# Patient Record
Sex: FEMALE | Race: WHITE | NOT HISPANIC OR LATINO | Employment: OTHER | URBAN - METROPOLITAN AREA
[De-identification: names, ages, dates, MRNs, and addresses within clinical notes are randomized per-mention and may not be internally consistent; named-entity substitution may affect disease eponyms.]

---

## 2017-01-09 ENCOUNTER — HOSPITAL ENCOUNTER (OUTPATIENT)
Dept: GENERAL RADIOLOGY | Facility: HOSPITAL | Age: 52
Discharge: HOME | End: 2017-01-09
Attending: INTERNAL MEDICINE | Admitting: INTERNAL MEDICINE

## 2017-01-09 DIAGNOSIS — M54.40 LOW BACK PAIN WITH SCIATICA, SCIATICA LATERALITY UNSPECIFIED, UNSPECIFIED BACK PAIN LATERALITY, UNSPECIFIED CHRONICITY: Primary | ICD-10-CM

## 2017-01-09 PROCEDURE — 72100 X-RAY EXAM L-S SPINE 2/3 VWS: CPT

## 2017-01-10 DIAGNOSIS — M54.50 LOW BACK PAIN WITH RADIATION, UNSPECIFIED LATERALITY: Primary | ICD-10-CM

## 2017-01-12 RX ORDER — CELECOXIB 200 MG/1
200 CAPSULE ORAL DAILY
Qty: 90 CAPSULE | Refills: 0 | Status: SHIPPED | OUTPATIENT
Start: 2017-01-12 | End: 2017-04-14 | Stop reason: SDUPTHER

## 2017-01-13 ENCOUNTER — HOSPITAL ENCOUNTER (OUTPATIENT)
Dept: MRI IMAGING | Facility: HOSPITAL | Age: 52
Discharge: HOME | End: 2017-01-13
Attending: INTERNAL MEDICINE | Admitting: INTERNAL MEDICINE

## 2017-01-13 PROCEDURE — 72148 MRI LUMBAR SPINE W/O DYE: CPT

## 2017-01-16 DIAGNOSIS — M51.36 LUMBAR ADJACENT SEGMENT DISEASE WITH SPONDYLOLISTHESIS: ICD-10-CM

## 2017-01-16 DIAGNOSIS — M43.06 LUMBAR SPONDYLOLYSIS: Primary | ICD-10-CM

## 2017-01-16 DIAGNOSIS — M43.16 LUMBAR ADJACENT SEGMENT DISEASE WITH SPONDYLOLISTHESIS: ICD-10-CM

## 2017-01-16 DIAGNOSIS — M54.50 ACUTE MIDLINE LOW BACK PAIN WITHOUT SCIATICA: Primary | ICD-10-CM

## 2017-01-16 RX ORDER — OXYCODONE AND ACETAMINOPHEN 7.5; 325 MG/1; MG/1
1 TABLET ORAL EVERY 8 HOURS PRN
Qty: 30 TABLET | Refills: 0 | Status: SHIPPED | OUTPATIENT
Start: 2017-01-16 | End: 2017-11-13

## 2017-01-20 RX ORDER — ZOLPIDEM TARTRATE 10 MG/1
10 TABLET ORAL NIGHTLY PRN
Qty: 30 TABLET | Refills: 1 | OUTPATIENT
Start: 2017-01-20 | End: 2017-07-03 | Stop reason: SDUPTHER

## 2017-01-21 ENCOUNTER — OFFICE VISIT (OUTPATIENT)
Dept: NEUROSURGERY | Facility: CLINIC | Age: 52
End: 2017-01-21

## 2017-01-21 VITALS — TEMPERATURE: 97.5 F | WEIGHT: 181 LBS | BODY MASS INDEX: 27.43 KG/M2 | HEIGHT: 68 IN

## 2017-01-21 DIAGNOSIS — M47.816 FACET ARTHROPATHY, LUMBAR: ICD-10-CM

## 2017-01-21 DIAGNOSIS — M51.36 DDD (DEGENERATIVE DISC DISEASE), LUMBAR: Primary | ICD-10-CM

## 2017-01-21 DIAGNOSIS — S39.92XA TAILBONE INJURY, INITIAL ENCOUNTER: ICD-10-CM

## 2017-01-21 PROCEDURE — 99243 OFF/OP CNSLTJ NEW/EST LOW 30: CPT | Performed by: NEUROLOGICAL SURGERY

## 2017-01-21 NOTE — MR AVS SNAPSHOT
[facility]  [phone]                    Vanna Black   1/21/2017 9:45 AM   Office Visit    Dept Phone:  [phone]   Encounter #:  [id number]    Provider:  Yuri Bahena MD   Department:  [facility]                Your Full Care Plan              Your Updated Medication List          This list is accurate as of: 1/21/17 10:00 AM.  Always use your most recent med list.                buPROPion  MG 24 hr tablet   Commonly known as:  WELLBUTRIN XL   TAKE ONE TABLET BY MOUTH ONCE DAILY       celecoxib 200 MG capsule   Commonly known as:  CeleBREX   Take 1 capsule by mouth Daily.       HYDROcodone-acetaminophen 7.5-325 MG per tablet   Commonly known as:  NORCO   Take 0.5 tablets by mouth 2 (Two) Times a Day As Needed for moderate pain (4-6).       mupirocin 2 % ointment   Commonly known as:  BACTROBAN       oxyCODONE-acetaminophen 7.5-325 MG per tablet   Commonly known as:  PERCOCET   Take 1 tablet by mouth Every 8 (Eight) Hours As Needed for moderate pain (4-6).       promethazine 25 MG tablet   Commonly known as:  PHENERGAN       * sertraline 50 MG tablet   Commonly known as:  ZOLOFT       * sertraline 100 MG tablet   Commonly known as:  ZOLOFT   TAKE ONE TABLET BY MOUTH ONCE DAILY       triamterene-hydrochlorothiazide 37.5-25 MG per tablet   Commonly known as:  MAXZIDE-25   TAKE ONE TABLET BY MOUTH ONCE DAILY       zolpidem 10 MG tablet   Commonly known as:  AMBIEN   Take 1 tablet by mouth At Night As Needed for sleep.       * Notice:  This list has 2 medication(s) that are the same as other medications prescribed for you. Read the directions carefully, and ask your doctor or other care provider to review them with you.            We Performed the Following     Ambulatory Referral to Physical Therapy Evaluate and treat, Neuro       You Were Diagnosed With        Codes Comments    DDD (degenerative disc disease),  "lumbar    -  Primary ICD-10-CM: M51.36  ICD-9-CM: 722.52     Facet arthropathy, lumbar     ICD-10-CM: M12.88  ICD-9-CM: 721.3     Tailbone injury, initial encounter     ICD-10-CM: S39.92XA  ICD-9-CM: 959.19       Instructions     None    Patient Instructions History      Upcoming Appointments     Visit Type Date Time Department    NEW PATIENT 2017  9:45 AM [facility]      [employer] Signup     [facility] [employer] allows you to send messages to your doctor, view your test results, renew your prescriptions, schedule appointments, and more. To sign up, go to [employer] and click on the Sign Up Now link in the New User? box. Enter your [employer] Activation Code exactly as it appears below along with the last four digits of your Social Security Number and your Date of Birth () to complete the sign-up process. If you do not sign up before the expiration date, you must request a new code.    [employer] Activation Code: [email or web address]  Expires: 2017  9:59 AM    If you have questions, you can email [email or web address] or call [phone] to talk to our [employer] staff. Remember, [employer] is NOT to be used for urgent needs. For medical emergencies, dial 911.               Other Info from Your Visit           Allergies     No Known Allergies      Reason for Visit     Back Pain TAILBONE      Vital Signs     Temperature Height Weight Last Menstrual Period Body Mass Index Smoking Status    97.5 °F (36.4 °C) 68\" (172.7 cm) 181 lb (82.1 kg) (LMP Unknown) 27.52 kg/m2 Never Smoker      Problems and Diagnoses Noted     Degeneration of lumbar or lumbosacral intervertebral disc    -  Primary    Joint disorder        Tailbone injury            "

## 2017-01-21 NOTE — PROGRESS NOTES
"Patient: Vanna Black  : 1965    Primary Care Provider: Abdullahi Nielsen MD    Requesting Provider: As above        History    Chief Complaint: Tailbone and low back pain.    History of Present Illness: Ms. Black is a 51 year old woman who runs an exercise and rehabilitation business who has had chronic low back difficulties over the years.  About 2 weeks ago she fell in a martial arts class and suffered an axial load when she landed on someone's foot.  She's had severe tailbone region pain since that time but she has also had a flareup in her low back pain.  At baseline she has some numbness in her right leg.  She denies any radicular symptoms in her legs.  She denies bowel or bladder dysfunction.  Her pain is a good deal better than it was a couple of weeks ago but she's been inactive.  She has not been doing her normal work.  She has avoided sitting upright.    Review of Systems   Constitutional: Positive for activity change.   Eyes: Positive for visual disturbance.   Gastrointestinal: Positive for abdominal distention.   Musculoskeletal: Positive for back pain, neck pain and neck stiffness.   Psychiatric/Behavioral: Positive for sleep disturbance. The patient is nervous/anxious.    All other systems reviewed and are negative.      The patient's past medical history, past surgical history, family history, and social history have been reviewed at length in the electronic medical record.    Physical Exam:   Visit Vitals   • Temp 97.5 °F (36.4 °C)   • Ht 68\" (172.7 cm)   • Wt 181 lb (82.1 kg)   • LMP  (LMP Unknown)   • BMI 27.52 kg/m2     CONSTITUTIONAL: Patient is well-nourished, pleasant and appears stated age.  CV: Heart regular rate and rhythm without murmur, rub, or gallop.  PULMONARY: Lungs are clear to ascultation.  MUSCULOSKELETAL:  Straight leg raising is negative.  Lloyd's Sign is negative.  ROM in back normal.  Tenderness in the back to palpation is mildly present in the lumbosacral " midline and over the coccyx.  NEUROLOGICAL:  Orientation, memory, attention span, language function, and cognition have been examined and are intact.  Strength is intact in the lower extremities to direct testing.  Muscle tone is normal throughout.  Station and gait are normal.  Sensation is intact to light touch testing throughout except in the side of the right leg where it is diminished.  Deep tendon reflexes are 2+ and symmetrical.  Coordination is intact.      Medical Decision Making    Data Review:   MRI of the lumbar spine demonstrates diffuse degenerative disc disease and diffuse facet arthropathy.  There is no significant root or canal compromise.  I can see down to the low sacrum on the axial images.  The tip of the coccyx is not visualized.    Diagnosis:   Flareup of mechanical low back pain as well as a probable coccygeal contusion.    Treatment Options:   I have referred the patient for physical therapy.  I've also asked that she take Advil 400 mg 3 times a day.  She'll follow-up in my clinic in several weeks to check on her progress.  If the tailbone region pain continues then I'll order some additional studies to actually look at the tip of her coccyx.  Even if she has a fracture the treatment will not be different than it is now.       Diagnosis Plan   1. DDD (degenerative disc disease), lumbar     2. Facet arthropathy, lumbar  Ambulatory Referral to Physical Therapy Evaluate and treat, Neuro   3. Tailbone injury, initial encounter             I, Dr. Bahena, personally performed the services described in the documentation, as scribed in my presence, and it is both accurate and complete.

## 2017-03-27 DIAGNOSIS — M25.511 RIGHT SHOULDER PAIN, UNSPECIFIED CHRONICITY: Primary | ICD-10-CM

## 2017-03-27 DIAGNOSIS — M25.561 RIGHT KNEE PAIN, UNSPECIFIED CHRONICITY: ICD-10-CM

## 2017-03-31 RX ORDER — SERTRALINE HYDROCHLORIDE 100 MG/1
TABLET, FILM COATED ORAL
Qty: 90 TABLET | Refills: 0 | Status: SHIPPED | OUTPATIENT
Start: 2017-03-31 | End: 2017-11-13

## 2017-03-31 RX ORDER — TRIAMTERENE AND HYDROCHLOROTHIAZIDE 37.5; 25 MG/1; MG/1
TABLET ORAL
Qty: 90 TABLET | Refills: 0 | Status: SHIPPED | OUTPATIENT
Start: 2017-03-31 | End: 2017-06-15 | Stop reason: SDUPTHER

## 2017-04-14 RX ORDER — CELECOXIB 200 MG/1
CAPSULE ORAL
Qty: 90 CAPSULE | Refills: 0 | Status: SHIPPED | OUTPATIENT
Start: 2017-04-14 | End: 2017-07-03 | Stop reason: SDUPTHER

## 2017-06-12 ENCOUNTER — TELEPHONE (OUTPATIENT)
Dept: INTERNAL MEDICINE | Facility: CLINIC | Age: 52
End: 2017-06-12

## 2017-06-12 RX ORDER — PROMETHAZINE HYDROCHLORIDE 25 MG/1
25 TABLET ORAL EVERY 6 HOURS PRN
Qty: 21 TABLET | Refills: 0 | Status: SHIPPED | OUTPATIENT
Start: 2017-06-12 | End: 2017-06-15 | Stop reason: SDUPTHER

## 2017-06-12 NOTE — TELEPHONE ENCOUNTER
PATIENT IS ABOUT TO GO [place] OF THE [place] AND NEEDS A PRESCRIPTION FOR PHENEGRAN TO TAKE WITH HER. PATIENT WOULD LIKE A CALL BACK WHEN IT HAS BEEN SENT IN. THANK YOU.

## 2017-06-15 RX ORDER — PROMETHAZINE HYDROCHLORIDE 25 MG/1
25 TABLET ORAL EVERY 6 HOURS PRN
Qty: 21 TABLET | Refills: 0 | Status: SHIPPED | OUTPATIENT
Start: 2017-06-15 | End: 2017-09-26 | Stop reason: SDUPTHER

## 2017-06-15 RX ORDER — TRIAMTERENE AND HYDROCHLOROTHIAZIDE 37.5; 25 MG/1; MG/1
TABLET ORAL
Qty: 90 TABLET | Refills: 0 | Status: SHIPPED | OUTPATIENT
Start: 2017-06-15 | End: 2017-09-28 | Stop reason: SDUPTHER

## 2017-06-15 NOTE — TELEPHONE ENCOUNTER
hctz 37.5 #90 3 fr's and also phengren 25mg pills. [facility] [phone]

## 2017-07-03 ENCOUNTER — TELEPHONE (OUTPATIENT)
Dept: INTERNAL MEDICINE | Facility: CLINIC | Age: 52
End: 2017-07-03

## 2017-07-03 RX ORDER — CELECOXIB 200 MG/1
CAPSULE ORAL
Qty: 90 CAPSULE | Refills: 0 | Status: SHIPPED | OUTPATIENT
Start: 2017-07-03 | End: 2018-08-08

## 2017-07-03 RX ORDER — ZOLPIDEM TARTRATE 10 MG/1
10 TABLET ORAL NIGHTLY PRN
Qty: 30 TABLET | Refills: 1 | OUTPATIENT
Start: 2017-07-03 | End: 2017-10-25 | Stop reason: SDUPTHER

## 2017-07-11 RX ORDER — CELECOXIB 200 MG/1
CAPSULE ORAL
Qty: 90 CAPSULE | Refills: 0 | Status: SHIPPED | OUTPATIENT
Start: 2017-07-11 | End: 2017-11-13

## 2017-08-07 RX ORDER — BUPROPION HYDROCHLORIDE 300 MG/1
TABLET ORAL
Qty: 90 TABLET | Refills: 0 | Status: SHIPPED | OUTPATIENT
Start: 2017-08-07 | End: 2017-11-13

## 2017-08-09 RX ORDER — BUPROPION HYDROCHLORIDE 300 MG/1
TABLET ORAL
Qty: 90 TABLET | Refills: 2 | Status: SHIPPED | OUTPATIENT
Start: 2017-08-09 | End: 2017-11-13

## 2017-09-26 ENCOUNTER — TELEPHONE (OUTPATIENT)
Dept: INTERNAL MEDICINE | Facility: CLINIC | Age: 52
End: 2017-09-26

## 2017-09-26 RX ORDER — PROMETHAZINE HYDROCHLORIDE 25 MG/1
25 TABLET ORAL EVERY 6 HOURS PRN
Qty: 30 TABLET | Refills: 1 | Status: SHIPPED | OUTPATIENT
Start: 2017-09-26 | End: 2019-09-04

## 2017-09-26 RX ORDER — PROMETHAZINE HYDROCHLORIDE 25 MG/1
25 TABLET ORAL EVERY 6 HOURS PRN
Qty: 30 TABLET | Refills: 1 | Status: SHIPPED | OUTPATIENT
Start: 2017-09-26 | End: 2017-09-26 | Stop reason: SDUPTHER

## 2017-09-28 ENCOUNTER — TELEPHONE (OUTPATIENT)
Dept: INTERNAL MEDICINE | Facility: CLINIC | Age: 52
End: 2017-09-28

## 2017-09-28 RX ORDER — TRIAMTERENE AND HYDROCHLOROTHIAZIDE 37.5; 25 MG/1; MG/1
1 TABLET ORAL DAILY
Qty: 90 TABLET | Refills: 0 | Status: SHIPPED | OUTPATIENT
Start: 2017-09-28 | End: 2017-12-26 | Stop reason: SDUPTHER

## 2017-10-25 ENCOUNTER — OFFICE VISIT (OUTPATIENT)
Dept: ORTHOPEDIC SURGERY | Facility: CLINIC | Age: 52
End: 2017-10-25

## 2017-10-25 VITALS — HEART RATE: 73 BPM | SYSTOLIC BLOOD PRESSURE: 122 MMHG | DIASTOLIC BLOOD PRESSURE: 75 MMHG | HEIGHT: 68 IN

## 2017-10-25 DIAGNOSIS — R52 PAIN: ICD-10-CM

## 2017-10-25 DIAGNOSIS — M70.51 PES ANSERINUS BURSITIS OF RIGHT KNEE: Primary | ICD-10-CM

## 2017-10-25 PROCEDURE — 20610 DRAIN/INJ JOINT/BURSA W/O US: CPT | Performed by: ORTHOPAEDIC SURGERY

## 2017-10-25 PROCEDURE — 99213 OFFICE O/P EST LOW 20 MIN: CPT | Performed by: ORTHOPAEDIC SURGERY

## 2017-10-25 RX ORDER — BETAMETHASONE SODIUM PHOSPHATE AND BETAMETHASONE ACETATE 3; 3 MG/ML; MG/ML
6 INJECTION, SUSPENSION INTRA-ARTICULAR; INTRALESIONAL; INTRAMUSCULAR; SOFT TISSUE
Status: COMPLETED | OUTPATIENT
Start: 2017-10-25 | End: 2017-10-25

## 2017-10-25 RX ORDER — ZOLPIDEM TARTRATE 10 MG/1
TABLET ORAL
Qty: 30 TABLET | Refills: 2 | OUTPATIENT
Start: 2017-10-25 | End: 2018-05-09 | Stop reason: SDUPTHER

## 2017-10-25 RX ORDER — LIDOCAINE HYDROCHLORIDE 10 MG/ML
4 INJECTION, SOLUTION INFILTRATION; PERINEURAL
Status: COMPLETED | OUTPATIENT
Start: 2017-10-25 | End: 2017-10-25

## 2017-10-25 RX ORDER — BUPIVACAINE HYDROCHLORIDE 2.5 MG/ML
4 INJECTION, SOLUTION INFILTRATION; PERINEURAL
Status: COMPLETED | OUTPATIENT
Start: 2017-10-25 | End: 2017-10-25

## 2017-10-25 RX ADMIN — BETAMETHASONE SODIUM PHOSPHATE AND BETAMETHASONE ACETATE 6 MG: 3; 3 INJECTION, SUSPENSION INTRA-ARTICULAR; INTRALESIONAL; INTRAMUSCULAR; SOFT TISSUE at 08:45

## 2017-10-25 RX ADMIN — LIDOCAINE HYDROCHLORIDE 4 ML: 10 INJECTION, SOLUTION INFILTRATION; PERINEURAL at 08:45

## 2017-10-25 RX ADMIN — BUPIVACAINE HYDROCHLORIDE 4 ML: 2.5 INJECTION, SOLUTION INFILTRATION; PERINEURAL at 08:45

## 2017-10-25 NOTE — PROGRESS NOTES
[facility] Note    Subjective     Chief Complaint   Patient presents with   • Right Knee - Follow-up     (R) Knee Arthroscopy Partial Lateral Meniscectomy 5/18/17        HPI      Vanna Black is a 52 y.o. female.  She is a complaint of right leg pain she had a history of the right foot to be about 20 years ago her pain started 3 days ago continues with walking or with rest.  Pain 6 out of 10.  It is stabbing and shooting.  She had a knee scope by me back on May 18, 2017 with partial lateral meniscectomy. She was doing well with that and she ran a 5K last weekend.        Past Medical History:   Diagnosis Date   • Arthritis    • Cancer     Skin   • Cervical dysplasia    • H/O colonoscopy       Past Surgical History:   Procedure Laterality Date   • BREAST LUMPECTOMY     • DIAGNOSTIC LAPAROSCOPY     • OTHER SURGICAL HISTORY      Facial surgery   • OTHER SURGICAL HISTORY      Leg repair   • TOTAL ABDOMINAL HYSTERECTOMY      removal of both ovaries      Family History   Problem Relation Age of Onset   • Mental illness Mother      mental disorder   • Hypertension Father    • Heart attack Father      Social History     Social History   • Marital status:      Spouse name: N/A   • Number of children: N/A   • Years of education: N/A     Occupational History   • Not on file.     Social History Main Topics   • Smoking status: Never Smoker   • Smokeless tobacco: Never Used   • Alcohol use Yes   • Drug use: No   • Sexual activity: Defer     Other Topics Concern   • Not on file     Social History Narrative      Current Outpatient Prescriptions on File Prior to Visit   Medication Sig Dispense Refill   • buPROPion XL (WELLBUTRIN XL) 300 MG 24 hr tablet TAKE ONE TABLET BY MOUTH ONCE DAILY 90 tablet 0   • buPROPion XL (WELLBUTRIN XL) 300 MG 24 hr tablet TAKE ONE TABLET BY MOUTH ONCE DAILY 90 tablet 2   • celecoxib (CeleBREX) 200 MG capsule TAKE ONE CAPSULE BY MOUTH ONCE DAILY 90 capsule 0   •  "celecoxib (CeleBREX) 200 MG capsule TAKE ONE CAPSULE BY MOUTH ONCE DAILY 90 capsule 0   • HYDROcodone-acetaminophen (NORCO) 7.5-325 MG per tablet Take 0.5 tablets by mouth 2 (Two) Times a Day As Needed for moderate pain (4-6). 30 tablet 0   • mupirocin (BACTROBAN) 2 % ointment Apply  topically 2 (two) times a day. Apply sparingly to affected area.     • oxyCODONE-acetaminophen (PERCOCET) 7.5-325 MG per tablet Take 1 tablet by mouth Every 8 (Eight) Hours As Needed for moderate pain (4-6). 30 tablet 0   • promethazine (PHENERGAN) 25 MG tablet Take 1 tablet by mouth Every 6 (Six) Hours As Needed for Nausea. 30 tablet 1   • sertraline (ZOLOFT) 100 MG tablet TAKE ONE TABLET BY MOUTH ONCE DAILY 90 tablet 0   • sertraline (ZOLOFT) 50 MG tablet Take 50 mg by mouth Daily.     • triamterene-hydrochlorothiazide (MAXZIDE-25) 37.5-25 MG per tablet Take 1 tablet by mouth Daily. 90 tablet 0   • zolpidem (AMBIEN) 10 MG tablet Take 1 tablet by mouth At Night As Needed for Sleep. 30 tablet 1     No current facility-administered medications on file prior to visit.       No Known Allergies     The following portions of the patient's history were reviewed and updated as appropriate: allergies, current medications, past family history, past medical history, past social history, past surgical history and problem list.    Review of Systems   Constitutional: Negative.    HENT: Negative.    Eyes: Positive for pain.   Respiratory: Negative.    Cardiovascular: Positive for leg swelling.   Gastrointestinal: Negative.    Endocrine: Negative.    Genitourinary: Negative.    Musculoskeletal: Positive for back pain.   Skin: Negative.    Allergic/Immunologic: Negative.    Neurological: Negative.    Hematological: Negative.    Psychiatric/Behavioral: Negative.         Objective      Physical Exam  /75  Pulse 73  Ht 68\" (172.7 cm)  LMP  (LMP Unknown)    There is no height or weight on file to calculate BMI.        GENERAL APPEARANCE: awake, " alert & oriented x 3, in no acute distress and well developed, well nourished  PSYCH: normal mood andaffect  LUNGS:  breathing nonlabored, no wheezing  EYES: sclera anicteric, pupils equal  CARDIOVASCULAR: palpable pulses dorsalis pedis, palpable posterior tibial bilaterally. Capillary refill less than 2 seconds  INTEGUMENTARY: skin intact, no clubbing, cyanosis  NEUROLOGIC:  Normal gait and balance            Ortho Exam  Peripheral Vascular:    Upper Extremity:   Inspection:  Left--no cyanotic nail beds Right--no cyanotic nail beds   Bilateral:  Pink nail beds with brisk capillary refill   Palpation:  Bilateral radial pulse normal    Musculoskeletal:  Global Assessment:  Overall assessment of Lower Extremity Muscle Strength and Tone:    Right quadriceps--5/5  Right hamstrings--5/5  Right tibialis anterior--5/5  Right gastroc soleus--5/5  Right EHL--5/5    Lower Extremity:  Knee/Patella:  No digital clubbing or cyanosis.    Examination of right knee reveals:  Normal deep tendon reflexes, coordination, strength, tone, sensation.  No known fractures or deformities.    Inspection and Palpation:      Right knee:  Tenderness:  Pes anserine bursa with swelling over the hamstring tendon insertion.    Effusion:  none  Crepitus:  none  Pulses:  2+  Ecchymosis:  None  Warmth:  None     ROM:  Right:  Extension:0    Flexion:135  Left:  Extension:0     Flexion:135    Instability:      Right:  Lachman Test:  Negative, Varus stress test negative, Valgus stress test negative   Anterior Drawer Test:  Negative, Posterior Drawer Test:  Negative    Deformities/Malalignments/Discrepancies:    Left:  none  Right:  none    Functional Testing:  Right:  Peyton's test:  Negative  Patella grind test:  Negative  Q-angle:  Normal  Apprehension Sign:  Negative        Imaging/Studies  Imaging Results (last 24 hours)     ** No results found for the last 24 hours. **      Previous records from [facility] orthopedics were reviewed.  Her summarized  as follow-up of a right knee partial lateral meniscectomy May 18, 2017.    Assessment/Plan      Vanna was seen today for follow-up.    Diagnoses and all orders for this visit:    Pes anserinus bursitis of right knee  -     Large Joint Arthrocentesis    Pain  -     Cancel: XR Shoulder 2+ View Right    Other orders  -     Cancel: Medium Joint Arthrocentesis    I gave her a steroid injection to her right knee bursa today.  She will follow-up in 3 weeks.  I offered her physical therapy but she just [facility] and does not want to go at this time.  But I recommended it.    Medical Decision Making  Management Options : over-the-counter medicine, prescription/IM medicine and physical/occupational therapy  Data/Risk: summary of old records    Joe Burrell MD  10/25/17  9:16 AM

## 2017-10-25 NOTE — PROGRESS NOTES
Procedure   Large Joint Arthrocentesis  Date/Time: 10/25/2017 8:45 AM  Consent given by: patient  Site marked: site marked  Timeout: Immediately prior to procedure a time out was called to verify the correct patient, procedure, equipment, support staff and site/side marked as required   Supporting Documentation  Indications: pain   Procedure Details  Location: knee (Pes Bursa ) - R knee  Preparation: Patient was prepped and draped in the usual sterile fashion  Needle size: 22 G  Approach: anterolateral  Medications administered: 6 mg betamethasone acetate-betamethasone sodium phosphate 6 (3-3) MG/ML; 4 mL bupivacaine; 4 mL lidocaine 1 %  Patient tolerance: patient tolerated the procedure well with no immediate complications

## 2017-11-13 ENCOUNTER — OFFICE VISIT (OUTPATIENT)
Dept: INTERNAL MEDICINE | Facility: CLINIC | Age: 52
End: 2017-11-13

## 2017-11-13 VITALS
DIASTOLIC BLOOD PRESSURE: 72 MMHG | WEIGHT: 178 LBS | BODY MASS INDEX: 26.98 KG/M2 | SYSTOLIC BLOOD PRESSURE: 106 MMHG | HEART RATE: 64 BPM | HEIGHT: 68 IN

## 2017-11-13 DIAGNOSIS — I10 ESSENTIAL HYPERTENSION: Primary | ICD-10-CM

## 2017-11-13 DIAGNOSIS — M25.561 ACUTE PAIN OF RIGHT KNEE: ICD-10-CM

## 2017-11-13 DIAGNOSIS — F41.9 ANXIETY: ICD-10-CM

## 2017-11-13 DIAGNOSIS — F32.89 OTHER DEPRESSION: ICD-10-CM

## 2017-11-13 DIAGNOSIS — J20.8 ACUTE BRONCHITIS DUE TO OTHER SPECIFIED ORGANISMS: ICD-10-CM

## 2017-11-13 PROCEDURE — 99214 OFFICE O/P EST MOD 30 MIN: CPT | Performed by: INTERNAL MEDICINE

## 2017-11-13 RX ORDER — DULOXETIN HYDROCHLORIDE 60 MG/1
60 CAPSULE, DELAYED RELEASE ORAL DAILY
Qty: 30 CAPSULE | Refills: 5 | Status: SHIPPED | OUTPATIENT
Start: 2017-11-13 | End: 2018-05-16 | Stop reason: SDUPTHER

## 2017-11-13 RX ORDER — AZITHROMYCIN 250 MG/1
TABLET, FILM COATED ORAL
Qty: 6 TABLET | Refills: 0 | Status: SHIPPED | OUTPATIENT
Start: 2017-11-13 | End: 2018-02-05

## 2017-11-13 RX ORDER — HYDROCODONE BITARTRATE AND ACETAMINOPHEN 7.5; 325 MG/1; MG/1
0.5 TABLET ORAL 2 TIMES DAILY PRN
Qty: 30 TABLET | Refills: 0 | Status: SHIPPED | OUTPATIENT
Start: 2017-11-13 | End: 2018-08-08 | Stop reason: SDUPTHER

## 2017-11-13 RX ORDER — ESTRADIOL 2 MG/1
RING VAGINAL
COMMUNITY
Start: 2017-11-08 | End: 2021-07-19

## 2017-11-13 NOTE — PROGRESS NOTES
Patient is a 52 y.o. female who is here for cough.  Chief Complaint   Patient presents with   • Cough         HPI:  Here for f/u.  Today c/o cough and congestion. Has discolored expectoration.  No sore throat.  No fever or chills.  No sinus pressure.  Little rhinorrhea.  No hemoptysis.  No recent antibiotics.      History:    Patient Active Problem List   Diagnosis   • Anxiety   • Depression   • Hypertension   • Insomnia   • Pain in joint   • H/O traveler's diarrhea   • Boil of scalp   • Acute pain of right knee   • Acute bronchitis due to other specified organisms       Past Medical History:   Diagnosis Date   • Arthritis    • Cancer     Skin   • Cervical dysplasia    • H/O colonoscopy        Past Surgical History:   Procedure Laterality Date   • BREAST LUMPECTOMY     • DIAGNOSTIC LAPAROSCOPY     • OTHER SURGICAL HISTORY      Facial surgery   • OTHER SURGICAL HISTORY      Leg repair   • TOTAL ABDOMINAL HYSTERECTOMY      removal of both ovaries       Current Outpatient Prescriptions on File Prior to Visit   Medication Sig   • celecoxib (CeleBREX) 200 MG capsule TAKE ONE CAPSULE BY MOUTH ONCE DAILY   • mupirocin (BACTROBAN) 2 % ointment Apply  topically 2 (two) times a day. Apply sparingly to affected area.   • promethazine (PHENERGAN) 25 MG tablet Take 1 tablet by mouth Every 6 (Six) Hours As Needed for Nausea.   • triamterene-hydrochlorothiazide (MAXZIDE-25) 37.5-25 MG per tablet Take 1 tablet by mouth Daily.   • zolpidem (AMBIEN) 10 MG tablet TAKE ONE TABLET BY MOUTH ONCE DAILY AT BEDTIME AS NEEDED   • [DISCONTINUED] buPROPion XL (WELLBUTRIN XL) 300 MG 24 hr tablet TAKE ONE TABLET BY MOUTH ONCE DAILY   • [DISCONTINUED] HYDROcodone-acetaminophen (NORCO) 7.5-325 MG per tablet Take 0.5 tablets by mouth 2 (Two) Times a Day As Needed for moderate pain (4-6).   • [DISCONTINUED] sertraline (ZOLOFT) 50 MG tablet Take 50 mg by mouth Daily.   • [DISCONTINUED] buPROPion XL (WELLBUTRIN XL) 300 MG 24 hr tablet TAKE ONE TABLET  BY MOUTH ONCE DAILY   • [DISCONTINUED] celecoxib (CeleBREX) 200 MG capsule TAKE ONE CAPSULE BY MOUTH ONCE DAILY   • [DISCONTINUED] oxyCODONE-acetaminophen (PERCOCET) 7.5-325 MG per tablet Take 1 tablet by mouth Every 8 (Eight) Hours As Needed for moderate pain (4-6).   • [DISCONTINUED] sertraline (ZOLOFT) 100 MG tablet TAKE ONE TABLET BY MOUTH ONCE DAILY     No current facility-administered medications on file prior to visit.        Family History   Problem Relation Age of Onset   • Mental illness Mother      mental disorder   • Hypertension Father    • Heart attack Father        Social History     Social History   • Marital status:      Spouse name: N/A   • Number of children: N/A   • Years of education: N/A     Occupational History   • Not on file.     Social History Main Topics   • Smoking status: Never Smoker   • Smokeless tobacco: Never Used   • Alcohol use Yes   • Drug use: No   • Sexual activity: Defer     Other Topics Concern   • Not on file     Social History Narrative         ROS:    Review of Systems   Constitutional: Negative for chills, diaphoresis, fatigue, fever and unexpected weight change.   HENT: Negative for congestion, ear pain, hearing loss, nosebleeds, postnasal drip, sinus pressure and sore throat.    Eyes: Negative for pain, discharge and itching.   Respiratory: Positive for cough. Negative for chest tightness, shortness of breath and wheezing.    Cardiovascular: Negative for chest pain, palpitations and leg swelling.   Gastrointestinal: Negative for abdominal distention, abdominal pain, blood in stool, constipation, diarrhea, nausea and vomiting.        2008 colonoscopy normal   Endocrine: Negative for polydipsia and polyuria.   Genitourinary: Negative for difficulty urinating, dysuria, frequency and hematuria.        Followed by Dr Cooper   Musculoskeletal: Positive for arthralgias and joint swelling. Negative for back pain, gait problem and myalgias.   Skin: Negative for rash and  "wound.   Neurological: Negative for dizziness, syncope, weakness and headaches.   Psychiatric/Behavioral: Positive for dysphoric mood. Negative for sleep disturbance. The patient is not nervous/anxious.        /72  Pulse 64  Ht 68\" (172.7 cm)  Wt 178 lb (80.7 kg)  LMP  (LMP Unknown)  BMI 27.06 kg/m2    Physical Exam:    Physical Exam   Constitutional: She is oriented to person, place, and time. She appears well-developed and well-nourished.   HENT:   Head: Normocephalic and atraumatic.   Right Ear: External ear normal.   Left Ear: External ear normal.   Mouth/Throat: Oropharynx is clear and moist.   Eyes: Conjunctivae and EOM are normal.   Neck: Normal range of motion. Neck supple.   Cardiovascular: Normal rate, regular rhythm and normal heart sounds.    Pulmonary/Chest: Effort normal.   Rhonchi on left   Abdominal: Soft. Bowel sounds are normal.   Musculoskeletal: Normal range of motion.   Lymphadenopathy:     She has no cervical adenopathy.   Neurological: She is alert and oriented to person, place, and time.   Skin: Skin is warm and dry.   Psychiatric: She has a normal mood and affect. Her behavior is normal. Thought content normal.       Procedure:      Discussion/Summary:  depression/anxiety-will change to cymbalta  joint pain-advised to limit narc  insomnia-cont prn ambien  htn/edema-stable  Knee pain-improved s/p arthroscopy  Bronchitis-zpac  high risk meds-labs on rtc      labs noted and dw patient       Current Outpatient Prescriptions:   •  celecoxib (CeleBREX) 200 MG capsule, TAKE ONE CAPSULE BY MOUTH ONCE DAILY, Disp: 90 capsule, Rfl: 0  •  ESTRING 2 MG vaginal ring, , Disp: , Rfl:   •  HYDROcodone-acetaminophen (NORCO) 7.5-325 MG per tablet, Take 0.5 tablets by mouth 2 (Two) Times a Day As Needed for Moderate Pain ., Disp: 30 tablet, Rfl: 0  •  mupirocin (BACTROBAN) 2 % ointment, Apply  topically 2 (two) times a day. Apply sparingly to affected area., Disp: , Rfl:   •  promethazine " (PHENERGAN) 25 MG tablet, Take 1 tablet by mouth Every 6 (Six) Hours As Needed for Nausea., Disp: 30 tablet, Rfl: 1  •  triamterene-hydrochlorothiazide (MAXZIDE-25) 37.5-25 MG per tablet, Take 1 tablet by mouth Daily., Disp: 90 tablet, Rfl: 0  •  zolpidem (AMBIEN) 10 MG tablet, TAKE ONE TABLET BY MOUTH ONCE DAILY AT BEDTIME AS NEEDED, Disp: 30 tablet, Rfl: 2  •  azithromycin (ZITHROMAX Z-THIERRY) 250 MG tablet, Take 2 tablets the first day, then 1 tablet daily for 4 days., Disp: 6 tablet, Rfl: 0  •  DULoxetine (CYMBALTA) 60 MG capsule, Take 1 capsule by mouth Daily., Disp: 30 capsule, Rfl: 5        Vanna was seen today for cough.    Diagnoses and all orders for this visit:    Essential hypertension    Anxiety    Other depression  -     DULoxetine (CYMBALTA) 60 MG capsule; Take 1 capsule by mouth Daily.    Acute bronchitis due to other specified organisms  -     azithromycin (ZITHROMAX Z-THIERRY) 250 MG tablet; Take 2 tablets the first day, then 1 tablet daily for 4 days.    Acute pain of right knee  -     HYDROcodone-acetaminophen (NORCO) 7.5-325 MG per tablet; Take 0.5 tablets by mouth 2 (Two) Times a Day As Needed for Moderate Pain .

## 2017-12-05 ENCOUNTER — TELEPHONE (OUTPATIENT)
Dept: INTERNAL MEDICINE | Facility: CLINIC | Age: 52
End: 2017-12-05

## 2017-12-05 NOTE — TELEPHONE ENCOUNTER
PATIENT IS NEEDING A REFILL FOR AZITHROMYCIN. PATIENT SAID THAT SHE IS STILL HAVING CONGESTION AND A COUGH. PLEASE GIVE PATIENT A CALL

## 2017-12-06 ENCOUNTER — HOSPITAL ENCOUNTER (OUTPATIENT)
Dept: GENERAL RADIOLOGY | Facility: HOSPITAL | Age: 52
Discharge: HOME | End: 2017-12-06
Attending: INTERNAL MEDICINE | Admitting: INTERNAL MEDICINE

## 2017-12-06 DIAGNOSIS — R05.9 COUGH: Primary | ICD-10-CM

## 2017-12-06 PROCEDURE — 71020 HC CHEST PA AND LATERAL: CPT

## 2017-12-07 RX ORDER — DOXYCYCLINE HYCLATE 100 MG/1
100 CAPSULE ORAL 2 TIMES DAILY
Qty: 20 CAPSULE | Refills: 0 | Status: SHIPPED | OUTPATIENT
Start: 2017-12-07 | End: 2018-02-05

## 2017-12-26 ENCOUNTER — TELEPHONE (OUTPATIENT)
Dept: INTERNAL MEDICINE | Facility: CLINIC | Age: 52
End: 2017-12-26

## 2017-12-26 RX ORDER — TRIAMTERENE AND HYDROCHLOROTHIAZIDE 37.5; 25 MG/1; MG/1
TABLET ORAL
Qty: 90 TABLET | Refills: 0 | Status: CANCELLED | OUTPATIENT
Start: 2017-12-26

## 2017-12-26 RX ORDER — TRIAMTERENE AND HYDROCHLOROTHIAZIDE 37.5; 25 MG/1; MG/1
1 TABLET ORAL DAILY
Qty: 90 TABLET | Refills: 2 | Status: SHIPPED | OUTPATIENT
Start: 2017-12-26 | End: 2018-10-08 | Stop reason: SDUPTHER

## 2018-01-08 RX ORDER — CELECOXIB 200 MG/1
CAPSULE ORAL
Qty: 90 CAPSULE | Refills: 0 | Status: SHIPPED | OUTPATIENT
Start: 2018-01-08 | End: 2018-02-05 | Stop reason: SDUPTHER

## 2018-01-10 ENCOUNTER — TRANSCRIBE ORDERS (OUTPATIENT)
Dept: ADMINISTRATIVE | Facility: HOSPITAL | Age: 53
End: 2018-01-10

## 2018-01-10 DIAGNOSIS — Z12.31 VISIT FOR SCREENING MAMMOGRAM: Primary | ICD-10-CM

## 2018-02-05 ENCOUNTER — OFFICE VISIT (OUTPATIENT)
Dept: INTERNAL MEDICINE | Facility: CLINIC | Age: 53
End: 2018-02-05

## 2018-02-05 VITALS
SYSTOLIC BLOOD PRESSURE: 110 MMHG | DIASTOLIC BLOOD PRESSURE: 74 MMHG | HEIGHT: 68 IN | TEMPERATURE: 97.8 F | HEART RATE: 68 BPM

## 2018-02-05 DIAGNOSIS — F32.89 OTHER DEPRESSION: ICD-10-CM

## 2018-02-05 DIAGNOSIS — J02.8 PHARYNGITIS DUE TO OTHER ORGANISM: ICD-10-CM

## 2018-02-05 DIAGNOSIS — I10 ESSENTIAL HYPERTENSION: Primary | ICD-10-CM

## 2018-02-05 DIAGNOSIS — F41.9 ANXIETY: ICD-10-CM

## 2018-02-05 DIAGNOSIS — M25.50 ARTHRALGIA, UNSPECIFIED JOINT: ICD-10-CM

## 2018-02-05 PROBLEM — J20.8 ACUTE BRONCHITIS DUE TO OTHER SPECIFIED ORGANISMS: Status: RESOLVED | Noted: 2017-11-13 | Resolved: 2018-02-05

## 2018-02-05 PROBLEM — J02.9 PHARYNGITIS: Status: ACTIVE | Noted: 2018-02-05

## 2018-02-05 PROCEDURE — 99214 OFFICE O/P EST MOD 30 MIN: CPT | Performed by: INTERNAL MEDICINE

## 2018-02-05 RX ORDER — AMOXICILLIN AND CLAVULANATE POTASSIUM 875; 125 MG/1; MG/1
1 TABLET, FILM COATED ORAL
Qty: 14 TABLET | Refills: 0 | Status: SHIPPED | OUTPATIENT
Start: 2018-02-05 | End: 2018-02-28

## 2018-02-05 RX ORDER — FLUCONAZOLE 150 MG/1
150 TABLET ORAL ONCE
Qty: 1 TABLET | Refills: 0 | Status: SHIPPED | OUTPATIENT
Start: 2018-02-05 | End: 2018-02-05

## 2018-02-05 NOTE — PROGRESS NOTES
Patient is a 52 y.o. female who is here for cough.  Chief Complaint   Patient presents with   • Cough         HPI:  Here for f/u.  Today c/o sore throat/hoarse/cough with green expectoration.  Has HA and ear pain.  Was recently in [place].  No fever or chills.  No GI issues.  Some body aches.      History:    Patient Active Problem List   Diagnosis   • Anxiety   • Depression   • Hypertension   • Insomnia   • Pain in joint   • H/O traveler's diarrhea   • Boil of scalp   • Acute pain of right knee   • Pharyngitis       Past Medical History:   Diagnosis Date   • Arthritis    • Cancer     Skin   • Cervical dysplasia    • H/O colonoscopy        Past Surgical History:   Procedure Laterality Date   • BREAST LUMPECTOMY     • DIAGNOSTIC LAPAROSCOPY     • OTHER SURGICAL HISTORY      Facial surgery   • OTHER SURGICAL HISTORY      Leg repair   • TOTAL ABDOMINAL HYSTERECTOMY      removal of both ovaries       Current Outpatient Prescriptions on File Prior to Visit   Medication Sig   • celecoxib (CeleBREX) 200 MG capsule TAKE ONE CAPSULE BY MOUTH ONCE DAILY   • DULoxetine (CYMBALTA) 60 MG capsule Take 1 capsule by mouth Daily.   • ESTRING 2 MG vaginal ring    • HYDROcodone-acetaminophen (NORCO) 7.5-325 MG per tablet Take 0.5 tablets by mouth 2 (Two) Times a Day As Needed for Moderate Pain .   • mupirocin (BACTROBAN) 2 % ointment Apply  topically 2 (two) times a day. Apply sparingly to affected area.   • promethazine (PHENERGAN) 25 MG tablet Take 1 tablet by mouth Every 6 (Six) Hours As Needed for Nausea.   • triamterene-hydrochlorothiazide (MAXZIDE-25) 37.5-25 MG per tablet Take 1 tablet by mouth Daily.   • zolpidem (AMBIEN) 10 MG tablet TAKE ONE TABLET BY MOUTH ONCE DAILY AT BEDTIME AS NEEDED   • [DISCONTINUED] azithromycin (ZITHROMAX Z-THIERRY) 250 MG tablet Take 2 tablets the first day, then 1 tablet daily for 4 days.   • [DISCONTINUED] celecoxib (CeleBREX) 200 MG capsule TAKE ONE CAPSULE BY MOUTH ONCE DAILY   • [DISCONTINUED]  doxycycline (VIBRAMYCIN) 100 MG capsule Take 1 capsule by mouth 2 (Two) Times a Day.     No current facility-administered medications on file prior to visit.        Family History   Problem Relation Age of Onset   • Mental illness Mother      mental disorder   • Hypertension Father    • Heart attack Father        Social History     Social History   • Marital status:      Spouse name: N/A   • Number of children: N/A   • Years of education: N/A     Occupational History   • Not on file.     Social History Main Topics   • Smoking status: Never Smoker   • Smokeless tobacco: Never Used   • Alcohol use Yes   • Drug use: No   • Sexual activity: Defer     Other Topics Concern   • Not on file     Social History Narrative         ROS:    Review of Systems   Constitutional: Negative for chills, diaphoresis, fatigue, fever and unexpected weight change.   HENT: Positive for congestion and sore throat. Negative for ear pain, hearing loss, nosebleeds, postnasal drip and sinus pressure.    Eyes: Negative for pain, discharge and itching.   Respiratory: Positive for cough. Negative for chest tightness, shortness of breath and wheezing.    Cardiovascular: Negative for chest pain, palpitations and leg swelling.   Gastrointestinal: Negative for abdominal distention, abdominal pain, blood in stool, constipation, diarrhea, nausea and vomiting.        2008 colonoscopy normal   Endocrine: Negative for polydipsia and polyuria.   Genitourinary: Negative for difficulty urinating, dysuria, frequency and hematuria.        Followed by Dr Cooper   Musculoskeletal: Positive for arthralgias and joint swelling. Negative for back pain, gait problem and myalgias.   Skin: Negative for rash and wound.   Neurological: Negative for dizziness, syncope, weakness and headaches.   Psychiatric/Behavioral: Positive for dysphoric mood. Negative for sleep disturbance. The patient is not nervous/anxious.        /74  Pulse 68  Temp 97.8 °F (36.6 °C)  "(Temporal Artery )   Ht 172.7 cm (67.99\")  LMP  (LMP Unknown)    Physical Exam:    Physical Exam   Constitutional: She is oriented to person, place, and time. She appears well-developed and well-nourished.   HENT:   Head: Normocephalic and atraumatic.   Right Ear: External ear normal.   Left Ear: External ear normal.   Mouth/Throat: Oropharynx is clear and moist.   Posterior erythema   Eyes: Conjunctivae and EOM are normal.   Neck: Normal range of motion. Neck supple.   Cardiovascular: Normal rate, regular rhythm and normal heart sounds.    Pulmonary/Chest: Effort normal and breath sounds normal.   Abdominal: Soft. Bowel sounds are normal.   Musculoskeletal: Normal range of motion.   Lymphadenopathy:     She has no cervical adenopathy.   Neurological: She is alert and oriented to person, place, and time.   Skin: Skin is warm and dry.   Psychiatric: She has a normal mood and affect. Her behavior is normal. Thought content normal.       Procedure:      Discussion/Summary:    Depression/anxiety-cymbalta continuation  joint pain-advised to limit narc  insomnia-cont prn ambien  htn/edema-stable  Knee pain-improved s/p arthroscopy  Pharyngitis-augmentin rx and chloraseptic prn  high risk meds-labs on rtc      labs noted and dw patient        Current Outpatient Prescriptions:   •  celecoxib (CeleBREX) 200 MG capsule, TAKE ONE CAPSULE BY MOUTH ONCE DAILY, Disp: 90 capsule, Rfl: 0  •  DULoxetine (CYMBALTA) 60 MG capsule, Take 1 capsule by mouth Daily., Disp: 30 capsule, Rfl: 5  •  ESTRING 2 MG vaginal ring, , Disp: , Rfl:   •  HYDROcodone-acetaminophen (NORCO) 7.5-325 MG per tablet, Take 0.5 tablets by mouth 2 (Two) Times a Day As Needed for Moderate Pain ., Disp: 30 tablet, Rfl: 0  •  mupirocin (BACTROBAN) 2 % ointment, Apply  topically 2 (two) times a day. Apply sparingly to affected area., Disp: , Rfl:   •  promethazine (PHENERGAN) 25 MG tablet, Take 1 tablet by mouth Every 6 (Six) Hours As Needed for Nausea., Disp: 30 " tablet, Rfl: 1  •  triamterene-hydrochlorothiazide (MAXZIDE-25) 37.5-25 MG per tablet, Take 1 tablet by mouth Daily., Disp: 90 tablet, Rfl: 2  •  zolpidem (AMBIEN) 10 MG tablet, TAKE ONE TABLET BY MOUTH ONCE DAILY AT BEDTIME AS NEEDED, Disp: 30 tablet, Rfl: 2  •  amoxicillin-clavulanate (AUGMENTIN) 875-125 MG per tablet, Take 1 tablet by mouth 2 (Two) Times a Day., Disp: 14 tablet, Rfl: 0  •  fluconazole (DIFLUCAN) 150 MG tablet, Take 1 tablet by mouth 1 (One) Time for 1 dose., Disp: 1 tablet, Rfl: 0        Vanna was seen today for cough.    Diagnoses and all orders for this visit:    Essential hypertension    Pharyngitis due to other organism  -     fluconazole (DIFLUCAN) 150 MG tablet; Take 1 tablet by mouth 1 (One) Time for 1 dose.    Arthralgia, unspecified joint    Anxiety    Other depression    Other orders  -     amoxicillin-clavulanate (AUGMENTIN) 875-125 MG per tablet; Take 1 tablet by mouth 2 (Two) Times a Day.

## 2018-02-26 ENCOUNTER — TELEPHONE (OUTPATIENT)
Dept: INTERNAL MEDICINE | Facility: CLINIC | Age: 53
End: 2018-02-26

## 2018-02-26 RX ORDER — BENZONATATE 200 MG/1
200 CAPSULE ORAL 3 TIMES DAILY PRN
Qty: 30 CAPSULE | Refills: 0 | Status: SHIPPED | OUTPATIENT
Start: 2018-02-26 | End: 2018-02-28

## 2018-02-26 RX ORDER — AZITHROMYCIN 250 MG/1
TABLET, FILM COATED ORAL
Qty: 6 TABLET | Refills: 0 | Status: SHIPPED | OUTPATIENT
Start: 2018-02-26 | End: 2018-08-08

## 2018-02-28 ENCOUNTER — OFFICE VISIT (OUTPATIENT)
Dept: ORTHOPEDIC SURGERY | Facility: CLINIC | Age: 53
End: 2018-02-28

## 2018-02-28 VITALS
BODY MASS INDEX: 26.98 KG/M2 | DIASTOLIC BLOOD PRESSURE: 89 MMHG | SYSTOLIC BLOOD PRESSURE: 130 MMHG | HEIGHT: 68 IN | HEART RATE: 82 BPM | WEIGHT: 178 LBS

## 2018-02-28 DIAGNOSIS — M25.561 ACUTE PAIN OF RIGHT KNEE: Primary | ICD-10-CM

## 2018-02-28 PROCEDURE — 99214 OFFICE O/P EST MOD 30 MIN: CPT | Performed by: ORTHOPAEDIC SURGERY

## 2018-02-28 NOTE — PROGRESS NOTES
[facility] [place] [facility] [place]    Subjective     Chief Complaint   Patient presents with   • Follow-up     4 month - Reinjured Rt knee -  Follow up for Pes anserinus bursitis of right knee & (R) knee Arthroscopy Partial Lateral Meniscectomy 5/18/17        HPI      Vanna Black is a 52 y.o. female.  Patient complains of right knee injury that happened 2 weeks ago.    In martial arts She took down in 230 pound woman and felt immediate pain in her right knee.  This is the same knee that had a knee scope May 2017 for a partial lateral meniscectomy.  She is having giving way pain 7 out of 10 and the pain as stabbing.        Past Medical History:   Diagnosis Date   • Arthritis    • Cancer     Skin   • Cervical dysplasia    • H/O colonoscopy       Past Surgical History:   Procedure Laterality Date   • BREAST LUMPECTOMY     • DIAGNOSTIC LAPAROSCOPY     • OTHER SURGICAL HISTORY      Facial surgery   • OTHER SURGICAL HISTORY      Leg repair   • TOTAL ABDOMINAL HYSTERECTOMY      removal of both ovaries      Family History   Problem Relation Age of Onset   • Mental illness Mother      mental disorder   • Hypertension Father    • Heart attack Father      Social History     Social History   • Marital status:      Spouse name: N/A   • Number of children: N/A   • Years of education: N/A     Occupational History   • Not on file.     Social History Main Topics   • Smoking status: Never Smoker   • Smokeless tobacco: Never Used   • Alcohol use Yes   • Drug use: No   • Sexual activity: Defer     Other Topics Concern   • Not on file     Social History Narrative      Current Outpatient Prescriptions on File Prior to Visit   Medication Sig Dispense Refill   • azithromycin (ZITHROMAX Z-THIERRY) 250 MG tablet Take 2 tablets the first day, then 1 tablet daily for 4 days. 6 tablet 0   • celecoxib (CeleBREX) 200 MG capsule TAKE ONE CAPSULE BY MOUTH ONCE DAILY 90 capsule 0   • DULoxetine (CYMBALTA) 60 MG capsule Take 1 capsule  "by mouth Daily. 30 capsule 5   • ESTRING 2 MG vaginal ring      • HYDROcodone-acetaminophen (NORCO) 7.5-325 MG per tablet Take 0.5 tablets by mouth 2 (Two) Times a Day As Needed for Moderate Pain . 30 tablet 0   • mupirocin (BACTROBAN) 2 % ointment Apply  topically 2 (two) times a day. Apply sparingly to affected area.     • promethazine (PHENERGAN) 25 MG tablet Take 1 tablet by mouth Every 6 (Six) Hours As Needed for Nausea. 30 tablet 1   • triamterene-hydrochlorothiazide (MAXZIDE-25) 37.5-25 MG per tablet Take 1 tablet by mouth Daily. 90 tablet 2   • zolpidem (AMBIEN) 10 MG tablet TAKE ONE TABLET BY MOUTH ONCE DAILY AT BEDTIME AS NEEDED 30 tablet 2   • [DISCONTINUED] amoxicillin-clavulanate (AUGMENTIN) 875-125 MG per tablet Take 1 tablet by mouth 2 (Two) Times a Day. 14 tablet 0   • [DISCONTINUED] benzonatate (TESSALON) 200 MG capsule Take 1 capsule by mouth 3 (Three) Times a Day As Needed for Cough. 30 capsule 0     No current facility-administered medications on file prior to visit.       No Known Allergies     The following portions of the patient's history were reviewed and updated as appropriate: allergies, current medications, past family history, past medical history, past social history, past surgical history and problem list.    Review of Systems   Constitutional: Negative.    HENT: Negative.    Eyes: Negative.    Respiratory: Negative.    Cardiovascular: Negative.    Gastrointestinal: Negative.    Endocrine: Negative.    Genitourinary: Negative.    Musculoskeletal: Positive for arthralgias.   Skin: Negative.    Allergic/Immunologic: Negative.    Neurological: Negative.    Hematological: Negative.    Psychiatric/Behavioral: Negative.         Objective      Physical Exam  /89  Pulse 82  Ht 172.7 cm (67.99\")  Wt 80.7 kg (178 lb)  LMP  (LMP Unknown)  BMI 27.07 kg/m2    Body mass index is 27.07 kg/(m^2).        GENERAL APPEARANCE: awake, alert & oriented x 3, in no acute distress and well " developed, well nourished  PSYCH: normal mood and affect        Ortho Exam  Peripheral Vascular:    Upper Extremity:   Inspection:  Left--no cyanotic nail beds Right--no cyanotic nail beds   Bilateral:  Pink nail beds with brisk capillary refill   Palpation:  Bilateral radial pulse normal    Musculoskeletal:  Global Assessment:  Overall assessment of Lower Extremity Muscle Strength and Tone:    Right quadriceps--5/5  Right hamstrings--5/5  Right tibialis anterior--5/5  Right gastroc soleus--5/5  Right EHL--5/5    Lower Extremity:  Knee/Patella:  No digital clubbing or cyanosis.    Examination of right knee reveals:  Normal deep tendon reflexes, coordination, strength, tone, sensation.  No known fractures or deformities.    Inspection and Palpation:      Right knee:  Tenderness:  Medial joint line with swelling  Effusion:  1+  Crepitus:  none  Pulses:  2+  Ecchymosis:  None  Warmth:  None     ROM:  Right:  Extension:0    Flexion:135  Left:  Extension:0     Flexion:135    Instability:      Right:  Lachman Test:  Negative, Varus stress test negative, Valgus stress test negative    Posterior Drawer Test:  Negative    Deformities/Malalignments/Discrepancies:    Left:  none  Right:  none    Functional Testing:  Right:  Peyton's test:  Positive with medial click  Patella grind test:  Negative  Q-angle:  Normal  Apprehension Sign:  Negative          Assessment/Plan      Vanna was seen today for follow-up.    Diagnoses and all orders for this visit:    Acute pain of right knee  -     MRI Knee Right Without Contrast; Future  The plan will be an MRI right knee.  She most likely has a recurrent meniscus tear.  She will follow up after the MRI.    Medical Decision Making  Management Options : over-the-counter medicine  Data/Risk: radiology tests    Joe Burrell MD  02/28/18  9:05 AM

## 2018-03-01 ENCOUNTER — HOSPITAL ENCOUNTER (OUTPATIENT)
Dept: MRI IMAGING | Facility: HOSPITAL | Age: 53
Discharge: HOME | End: 2018-03-01
Attending: ORTHOPAEDIC SURGERY | Admitting: ORTHOPAEDIC SURGERY

## 2018-03-01 DIAGNOSIS — M25.561 ACUTE PAIN OF RIGHT KNEE: ICD-10-CM

## 2018-03-01 PROCEDURE — 73721 MRI JNT OF LWR EXTRE W/O DYE: CPT

## 2018-03-02 ENCOUNTER — TELEPHONE (OUTPATIENT)
Dept: ORTHOPEDIC SURGERY | Facility: CLINIC | Age: 53
End: 2018-03-02

## 2018-03-02 NOTE — TELEPHONE ENCOUNTER
Called patient back- the report is still preliminary in the chart- I made  Her an appt for Monday. She was happy with this.     Aure

## 2018-03-02 NOTE — TELEPHONE ENCOUNTER
Patient called in regards to MRI appointment yesterday. Would like results from MRI before next Friday. Patient leaves for vacation on 03/09/18.

## 2018-03-05 ENCOUNTER — OFFICE VISIT (OUTPATIENT)
Dept: ORTHOPEDIC SURGERY | Facility: CLINIC | Age: 53
End: 2018-03-05

## 2018-03-05 VITALS
BODY MASS INDEX: 26.98 KG/M2 | WEIGHT: 178 LBS | HEIGHT: 68 IN | SYSTOLIC BLOOD PRESSURE: 151 MMHG | HEART RATE: 76 BPM | DIASTOLIC BLOOD PRESSURE: 85 MMHG

## 2018-03-05 DIAGNOSIS — T14.8XXA BONE BRUISE: Primary | ICD-10-CM

## 2018-03-05 PROCEDURE — 99213 OFFICE O/P EST LOW 20 MIN: CPT | Performed by: ORTHOPAEDIC SURGERY

## 2018-03-05 NOTE — PROGRESS NOTES
[facility] [place] [facility] [place]    Subjective     Chief Complaint   Patient presents with   • Right Knee - Follow-up     After MRI 03/01/2018        HPI      Vanna Black is a 52 y.o. female.  She is follow-up MRI of the right knee pain is 9 out of 10.  She's tried ibuprofen.  Her injury was 3 weeks ago.        Past Medical History:   Diagnosis Date   • Arthritis    • Cancer     Skin   • Cervical dysplasia    • H/O colonoscopy       Past Surgical History:   Procedure Laterality Date   • BREAST LUMPECTOMY     • DIAGNOSTIC LAPAROSCOPY     • OTHER SURGICAL HISTORY      Facial surgery   • OTHER SURGICAL HISTORY      Leg repair   • TOTAL ABDOMINAL HYSTERECTOMY      removal of both ovaries      Family History   Problem Relation Age of Onset   • Mental illness Mother      mental disorder   • Hypertension Father    • Heart attack Father      Social History     Social History   • Marital status:      Spouse name: N/A   • Number of children: N/A   • Years of education: N/A     Occupational History   • Not on file.     Social History Main Topics   • Smoking status: Never Smoker   • Smokeless tobacco: Never Used   • Alcohol use Yes   • Drug use: No   • Sexual activity: Defer     Other Topics Concern   • Not on file     Social History Narrative      Current Outpatient Prescriptions on File Prior to Visit   Medication Sig Dispense Refill   • azithromycin (ZITHROMAX Z-THIERRY) 250 MG tablet Take 2 tablets the first day, then 1 tablet daily for 4 days. 6 tablet 0   • celecoxib (CeleBREX) 200 MG capsule TAKE ONE CAPSULE BY MOUTH ONCE DAILY 90 capsule 0   • DULoxetine (CYMBALTA) 60 MG capsule Take 1 capsule by mouth Daily. 30 capsule 5   • ESTRING 2 MG vaginal ring      • HYDROcodone-acetaminophen (NORCO) 7.5-325 MG per tablet Take 0.5 tablets by mouth 2 (Two) Times a Day As Needed for Moderate Pain . 30 tablet 0   • mupirocin (BACTROBAN) 2 % ointment Apply  topically 2 (two) times a day. Apply sparingly to affected  "area.     • promethazine (PHENERGAN) 25 MG tablet Take 1 tablet by mouth Every 6 (Six) Hours As Needed for Nausea. 30 tablet 1   • triamterene-hydrochlorothiazide (MAXZIDE-25) 37.5-25 MG per tablet Take 1 tablet by mouth Daily. 90 tablet 2   • zolpidem (AMBIEN) 10 MG tablet TAKE ONE TABLET BY MOUTH ONCE DAILY AT BEDTIME AS NEEDED 30 tablet 2     No current facility-administered medications on file prior to visit.       No Known Allergies     The following portions of the patient's history were reviewed and updated as appropriate: allergies, current medications, past family history, past medical history, past social history, past surgical history and problem list.    Review of Systems   Constitutional: Negative.    HENT: Negative.    Eyes: Negative.    Respiratory: Negative.    Cardiovascular: Negative.    Gastrointestinal: Negative.    Endocrine: Negative.    Genitourinary: Negative.    Musculoskeletal: Positive for arthralgias.   Skin: Negative.    Allergic/Immunologic: Negative.    Neurological: Negative.    Hematological: Negative.    Psychiatric/Behavioral: Negative.         Objective      Physical Exam  /85  Pulse 76  Ht 172.7 cm (67.99\")  Wt 80.7 kg (178 lb)  LMP  (LMP Unknown)  BMI 27.07 kg/m2    Body mass index is 27.07 kg/(m^2).        GENERAL APPEARANCE: awake, alert & oriented x 3, in no acute distress and well developed, well nourished  PSYCH: normal mood and affect        Ortho Exam  Peripheral Vascular:    Upper Extremity:   Inspection:  Left--no cyanotic nail beds Right--no cyanotic nail beds   Bilateral:  Pink nail beds with brisk capillary refill   Palpation:  Bilateral radial pulse normal    Musculoskeletal:  Global Assessment:  Overall assessment of Lower Extremity Muscle Strength and Tone:  Right quadriceps--5/5   Right hamstrings--5/5       Right tibialis anterior--5/5  Right gastroc-soleus--5/5  Right EHL --5/5    Lower Extremity:  Knee/Patella:  No digital clubbing or cyanosis.  "   Examination of left knee reveals:  Normal deep tendon reflexes, coordination, strength, tone, sensation.  No known fractures or deformities.    Inspection and Palpation:  Right knee:  Tenderness:  Over the medial joint line and moderate severity  Effusion:  none  Crepitus:  Positive  Pulses:  2+  Ecchymosis:  None  Warmth:  None     ROM:  Right:  Extension:120    Flexion:5  Left:  Extension:120    Flexion:5    Instability:    Right:  Lachman Test:  Negative, Varus stress test negative, Valgus stress test negative    Deformities/Malalignments/Discrepancies:    Left:  No deformity   Right:  Genu valgum    Functional Testing:  Peyton's test:  Negative  Patella grind test:  Positive  Q-angle:  normal      Imaging/Studies  Imaging Results (last 7 days)     ** No results found for the last 168 hours. **        I reviewed the MRI which shows a bone bruise medially and possible medial meniscus tear with metal artifact  Assessment/Plan      Vanna was seen today for follow-up.    Diagnoses and all orders for this visit:    Bone bruise   the plan is rest and follow-up in 3 weeks    Medical Decision Making  Management Options : over-the-counter medicine  Data/Risk: radiology tests and independent visualization of imaging, lab tests, or EMG/NCV    Joe Burrell MD  03/05/18  1:50 PM

## 2018-03-06 ENCOUNTER — TELEPHONE (OUTPATIENT)
Dept: ORTHOPEDIC SURGERY | Facility: CLINIC | Age: 53
End: 2018-03-06

## 2018-03-06 NOTE — TELEPHONE ENCOUNTER
PT CALLED AND SAID DR MATIAS TOLD HER NOT TO DO ANY TYPE OF EXERCISE BESIDES SWIMMING AND BIKING. SHE JUST WANTS TO MAKE SURE THAT IS OKAY. PLEASE CALL HER BACK AT [phone].

## 2018-03-06 NOTE — TELEPHONE ENCOUNTER
That is correct. I spoke with the patient. She needs to hold off on her exercise classes and she understands this. It is okay for her to bike and swim. --aco

## 2018-03-26 ENCOUNTER — OFFICE VISIT (OUTPATIENT)
Dept: ORTHOPEDIC SURGERY | Facility: CLINIC | Age: 53
End: 2018-03-26

## 2018-03-26 VITALS
WEIGHT: 177.91 LBS | HEIGHT: 68 IN | SYSTOLIC BLOOD PRESSURE: 137 MMHG | BODY MASS INDEX: 26.96 KG/M2 | HEART RATE: 81 BPM | DIASTOLIC BLOOD PRESSURE: 86 MMHG

## 2018-03-26 DIAGNOSIS — T14.8XXA BONE BRUISE: Primary | ICD-10-CM

## 2018-03-26 DIAGNOSIS — M23.203 OLD COMPLEX TEAR OF MEDIAL MENISCUS OF RIGHT KNEE: ICD-10-CM

## 2018-03-26 PROCEDURE — 99212 OFFICE O/P EST SF 10 MIN: CPT | Performed by: ORTHOPAEDIC SURGERY

## 2018-03-26 NOTE — PROGRESS NOTES
[facility] [place] [facility] [place]    Subjective     Chief Complaint   Patient presents with   • Right Knee - Follow-up     3 weeks        HPI      Vanna Black is a 53 y.o. female. She's doing better with her right knee.  She asked if she could have a brace.  Pain is 5 out of 10 aching and stabbing.  But she feels better.        Past Medical History:   Diagnosis Date   • Arthritis    • Cancer     Skin   • Cervical dysplasia    • H/O colonoscopy       Past Surgical History:   Procedure Laterality Date   • BREAST LUMPECTOMY     • DIAGNOSTIC LAPAROSCOPY     • OTHER SURGICAL HISTORY      Facial surgery   • OTHER SURGICAL HISTORY      Leg repair   • TOTAL ABDOMINAL HYSTERECTOMY      removal of both ovaries      Family History   Problem Relation Age of Onset   • Mental illness Mother      mental disorder   • Hypertension Father    • Heart attack Father      Social History     Social History   • Marital status:      Spouse name: N/A   • Number of children: N/A   • Years of education: N/A     Occupational History   • Not on file.     Social History Main Topics   • Smoking status: Never Smoker   • Smokeless tobacco: Never Used   • Alcohol use Yes   • Drug use: No   • Sexual activity: Defer     Other Topics Concern   • Not on file     Social History Narrative   • No narrative on file      Current Outpatient Prescriptions on File Prior to Visit   Medication Sig Dispense Refill   • azithromycin (ZITHROMAX Z-THIERRY) 250 MG tablet Take 2 tablets the first day, then 1 tablet daily for 4 days. 6 tablet 0   • celecoxib (CeleBREX) 200 MG capsule TAKE ONE CAPSULE BY MOUTH ONCE DAILY 90 capsule 0   • DULoxetine (CYMBALTA) 60 MG capsule Take 1 capsule by mouth Daily. 30 capsule 5   • ESTRING 2 MG vaginal ring      • HYDROcodone-acetaminophen (NORCO) 7.5-325 MG per tablet Take 0.5 tablets by mouth 2 (Two) Times a Day As Needed for Moderate Pain . 30 tablet 0   • mupirocin (BACTROBAN) 2 % ointment Apply  topically 2 (two)  "times a day. Apply sparingly to affected area.     • promethazine (PHENERGAN) 25 MG tablet Take 1 tablet by mouth Every 6 (Six) Hours As Needed for Nausea. 30 tablet 1   • triamterene-hydrochlorothiazide (MAXZIDE-25) 37.5-25 MG per tablet Take 1 tablet by mouth Daily. 90 tablet 2   • zolpidem (AMBIEN) 10 MG tablet TAKE ONE TABLET BY MOUTH ONCE DAILY AT BEDTIME AS NEEDED 30 tablet 2     No current facility-administered medications on file prior to visit.       No Known Allergies     The following portions of the patient's history were reviewed and updated as appropriate: allergies, current medications, past family history, past medical history, past social history, past surgical history and problem list.    Review of Systems   Constitutional: Negative.    HENT: Negative.    Eyes: Negative.    Respiratory: Negative.    Cardiovascular: Negative.    Gastrointestinal: Negative.    Endocrine: Negative.    Genitourinary: Negative.    Musculoskeletal: Positive for arthralgias.   Skin: Negative.    Allergic/Immunologic: Negative.    Neurological: Negative.    Hematological: Negative.    Psychiatric/Behavioral: Negative.         Objective      Physical Exam  /86   Pulse 81   Ht 172.7 cm (67.99\")   Wt 80.7 kg (177 lb 14.6 oz)   LMP  (LMP Unknown)   BMI 27.06 kg/m²     Body mass index is 27.06 kg/m².        GENERAL APPEARANCE: awake, alert & oriented x 3, in no acute distress and well developed, well nourished  PSYCH: normal mood and affect    Ortho Exam  Peripheral Vascular:    Upper Extremity:   Inspection:  Left--no cyanotic nail beds Right--no cyanotic nail beds   Bilateral:  Pink nail beds with brisk capillary refill   Palpation:  Bilateral radial pulse normal    Musculoskeletal:  Global Assessment:  Overall assessment of Lower Extremity Muscle Strength and Tone:    Right quadriceps--5/5  Right hamstrings--5/5  Right tibialis anterior--5/5  Right gastroc soleus--5/5  Right EHL--5/5    Lower " Extremity:  Knee/Patella:  No digital clubbing or cyanosis.    Examination of right knee reveals:  Normal deep tendon reflexes, coordination, strength, tone, sensation.  No known fractures or deformities.    Inspection and Palpation:      Right knee:  Tenderness:  Medial joint line  Effusion:  none  Crepitus:  none  Pulses:  2+  Ecchymosis:  None  Warmth:  None     ROM:  Right:  Extension:0    Flexion:135  Left:  Extension:0     Flexion:135    Instability:      Right:  Lachman Test:  Negative, Varus stress test negative, Valgus stress test negative   Anterior Drawer Test:  Negative, Posterior Drawer Test:  Negative    Deformities/Malalignments/Discrepancies:    Left:  none  Right:  none    Functional Testing:  Right:  Peyton's test:  Negative  Patella grind test:  Negative  Q-angle:  Normal  Apprehension Sign:  Negative        Assessment/Plan        ICD-10-CM ICD-9-CM   1. Bone bruise T14.8XXA 924.9   2. Old complex tear of medial meniscus of right knee M23.203 717.3     We will treat with a hinged knee brace for support.  She may advance activity as tolerated.  She'll follow-up in one month.  If she fails to improve consider looking at her medial meniscus again.  She had a recent MRI which questioned a possible medial meniscus tear.  She had a knee scope for partial lateral meniscectomy May of last year.    Medical Decision Making  Management Options : over-the-counter medicine      Joe Burrell MD  03/26/18  2:36 PM

## 2018-04-16 RX ORDER — CELECOXIB 200 MG/1
CAPSULE ORAL
Qty: 90 CAPSULE | Refills: 0 | Status: SHIPPED | OUTPATIENT
Start: 2018-04-16 | End: 2018-07-18 | Stop reason: SDUPTHER

## 2018-05-10 RX ORDER — ZOLPIDEM TARTRATE 10 MG/1
TABLET ORAL
Qty: 30 TABLET | Refills: 2 | OUTPATIENT
Start: 2018-05-10 | End: 2022-05-23

## 2018-05-16 DIAGNOSIS — F32.89 OTHER DEPRESSION: ICD-10-CM

## 2018-05-16 RX ORDER — DULOXETIN HYDROCHLORIDE 60 MG/1
CAPSULE, DELAYED RELEASE ORAL
Qty: 30 CAPSULE | Refills: 5 | Status: SHIPPED | OUTPATIENT
Start: 2018-05-16 | End: 2018-05-17 | Stop reason: SDUPTHER

## 2018-05-17 DIAGNOSIS — F32.89 OTHER DEPRESSION: ICD-10-CM

## 2018-05-17 RX ORDER — DULOXETIN HYDROCHLORIDE 60 MG/1
60 CAPSULE, DELAYED RELEASE ORAL DAILY
Qty: 90 CAPSULE | Refills: 1 | Status: SHIPPED | OUTPATIENT
Start: 2018-05-17 | End: 2019-05-23 | Stop reason: SDUPTHER

## 2018-05-23 ENCOUNTER — HOSPITAL ENCOUNTER (OUTPATIENT)
Dept: MAMMOGRAPHY | Facility: HOSPITAL | Age: 53
Discharge: HOME | End: 2018-05-23
Admitting: OBSTETRICS & GYNECOLOGY

## 2018-05-23 DIAGNOSIS — Z12.31 VISIT FOR SCREENING MAMMOGRAM: ICD-10-CM

## 2018-05-23 PROCEDURE — 77067 SCR MAMMO BI INCL CAD: CPT | Performed by: RADIOLOGY

## 2018-05-23 PROCEDURE — 77063 BREAST TOMOSYNTHESIS BI: CPT | Performed by: RADIOLOGY

## 2018-05-23 PROCEDURE — 77067 SCR MAMMO BI INCL CAD: CPT

## 2018-05-23 PROCEDURE — 77063 BREAST TOMOSYNTHESIS BI: CPT

## 2018-06-12 RX ORDER — DIAZEPAM 2 MG/1
2 TABLET ORAL 2 TIMES DAILY PRN
Qty: 5 TABLET | Refills: 0 | Status: SHIPPED | OUTPATIENT
Start: 2018-06-12 | End: 2019-02-14

## 2018-06-29 RX ORDER — CIPROFLOXACIN 500 MG/1
500 TABLET, FILM COATED ORAL EVERY 12 HOURS SCHEDULED
Qty: 6 TABLET | Refills: 0 | Status: SHIPPED | OUTPATIENT
Start: 2018-06-29 | End: 2018-08-08

## 2018-07-18 RX ORDER — CELECOXIB 200 MG/1
CAPSULE ORAL
Qty: 90 CAPSULE | Refills: 0 | Status: SHIPPED | OUTPATIENT
Start: 2018-07-18 | End: 2018-10-03 | Stop reason: SDUPTHER

## 2018-08-08 ENCOUNTER — OFFICE VISIT (OUTPATIENT)
Dept: INTERNAL MEDICINE | Facility: CLINIC | Age: 53
End: 2018-08-08

## 2018-08-08 VITALS — SYSTOLIC BLOOD PRESSURE: 124 MMHG | HEART RATE: 72 BPM | DIASTOLIC BLOOD PRESSURE: 74 MMHG | HEIGHT: 68 IN

## 2018-08-08 DIAGNOSIS — I10 ESSENTIAL HYPERTENSION: Primary | ICD-10-CM

## 2018-08-08 DIAGNOSIS — K21.9 GASTROESOPHAGEAL REFLUX DISEASE WITHOUT ESOPHAGITIS: ICD-10-CM

## 2018-08-08 DIAGNOSIS — F32.89 OTHER DEPRESSION: ICD-10-CM

## 2018-08-08 DIAGNOSIS — F41.9 ANXIETY: ICD-10-CM

## 2018-08-08 DIAGNOSIS — R07.9 CHEST PAIN, UNSPECIFIED TYPE: ICD-10-CM

## 2018-08-08 DIAGNOSIS — M25.561 ACUTE PAIN OF RIGHT KNEE: ICD-10-CM

## 2018-08-08 DIAGNOSIS — G47.00 INSOMNIA, UNSPECIFIED TYPE: ICD-10-CM

## 2018-08-08 DIAGNOSIS — R53.83 OTHER FATIGUE: ICD-10-CM

## 2018-08-08 PROBLEM — J02.9 PHARYNGITIS: Status: RESOLVED | Noted: 2018-02-05 | Resolved: 2018-08-08

## 2018-08-08 PROCEDURE — 99214 OFFICE O/P EST MOD 30 MIN: CPT | Performed by: INTERNAL MEDICINE

## 2018-08-08 PROCEDURE — 93000 ELECTROCARDIOGRAM COMPLETE: CPT | Performed by: INTERNAL MEDICINE

## 2018-08-08 RX ORDER — HYDROCODONE BITARTRATE AND ACETAMINOPHEN 7.5; 325 MG/1; MG/1
0.5 TABLET ORAL 2 TIMES DAILY PRN
Qty: 30 TABLET | Refills: 0 | Status: SHIPPED | OUTPATIENT
Start: 2018-08-08 | End: 2019-02-14 | Stop reason: SDUPTHER

## 2018-08-08 RX ORDER — DEXLANSOPRAZOLE 60 MG/1
60 CAPSULE, DELAYED RELEASE ORAL EVERY MORNING
Qty: 90 CAPSULE | Refills: 3 | Status: SHIPPED | OUTPATIENT
Start: 2018-08-08 | End: 2018-08-17

## 2018-08-08 NOTE — PROGRESS NOTES
Patient is a 53 y.o. female who is here for nausea, chest pain.  Chief Complaint   Patient presents with   • Chest Pain         HPI:  Patient c/o several month hx of Chest discomfort.  No relation to activity.  Has associated dizziness and nausea.  Described as a heaviness.  No related to meals.  Improved with eating.  Will occasionally wake her up at night.  Improved with getting up and moving around.     History:    Patient Active Problem List   Diagnosis   • Anxiety   • Depression   • Hypertension   • Insomnia   • Pain in joint   • H/O traveler's diarrhea   • Boil of scalp   • Acute pain of right knee   • Fatigue       Past Medical History:   Diagnosis Date   • Arthritis    • Cancer (CMS/HCC)     Skin   • Cervical dysplasia    • H/O colonoscopy    • Ovarian cancer (CMS/HCC)        Past Surgical History:   Procedure Laterality Date   • AUGMENTATION MAMMAPLASTY     • BREAST EXCISIONAL BIOPSY Left    • DIAGNOSTIC LAPAROSCOPY     • OOPHORECTOMY     • OTHER SURGICAL HISTORY      Facial surgery   • OTHER SURGICAL HISTORY      Leg repair   • TOTAL ABDOMINAL HYSTERECTOMY      removal of both ovaries       Current Outpatient Prescriptions on File Prior to Visit   Medication Sig   • celecoxib (CeleBREX) 200 MG capsule TAKE 1 CAPSULE BY MOUTH ONCE DAILY   • diazePAM (VALIUM) 2 MG tablet Take 1 tablet by mouth 2 (Two) Times a Day As Needed for Anxiety.   • DULoxetine (CYMBALTA) 60 MG capsule Take 1 capsule by mouth Daily.   • ESTRING 2 MG vaginal ring    • promethazine (PHENERGAN) 25 MG tablet Take 1 tablet by mouth Every 6 (Six) Hours As Needed for Nausea.   • triamterene-hydrochlorothiazide (MAXZIDE-25) 37.5-25 MG per tablet Take 1 tablet by mouth Daily.   • zolpidem (AMBIEN) 10 MG tablet TAKE ONE TABLET BY MOUTH AT BEDTIME AS NEEDED   • [DISCONTINUED] HYDROcodone-acetaminophen (NORCO) 7.5-325 MG per tablet Take 0.5 tablets by mouth 2 (Two) Times a Day As Needed for Moderate Pain .   • mupirocin (BACTROBAN) 2 % ointment  Apply  topically 2 (two) times a day. Apply sparingly to affected area.   • [DISCONTINUED] azithromycin (ZITHROMAX Z-THIERRY) 250 MG tablet Take 2 tablets the first day, then 1 tablet daily for 4 days.   • [DISCONTINUED] celecoxib (CeleBREX) 200 MG capsule TAKE ONE CAPSULE BY MOUTH ONCE DAILY   • [DISCONTINUED] ciprofloxacin (CIPRO) 500 MG tablet Take 1 tablet by mouth Every 12 (Twelve) Hours.     No current facility-administered medications on file prior to visit.        Family History   Problem Relation Age of Onset   • Mental illness Mother         mental disorder   • Breast cancer Mother 54   • Hypertension Father    • Heart attack Father        Social History     Social History   • Marital status:      Spouse name: N/A   • Number of children: N/A   • Years of education: N/A     Occupational History   • Not on file.     Social History Main Topics   • Smoking status: Never Smoker   • Smokeless tobacco: Never Used   • Alcohol use Yes   • Drug use: No   • Sexual activity: Defer     Other Topics Concern   • Not on file     Social History Narrative   • No narrative on file         Review of Systems   Constitutional: Positive for fatigue and unexpected weight change. Negative for chills, diaphoresis and fever.   HENT: Negative for ear pain, hearing loss, nosebleeds, postnasal drip and sinus pressure.    Eyes: Negative for pain, discharge and itching.   Respiratory: Negative for chest tightness, shortness of breath and wheezing.    Cardiovascular: Positive for chest pain. Negative for palpitations and leg swelling.   Gastrointestinal: Positive for nausea. Negative for abdominal distention, abdominal pain, blood in stool, constipation, diarrhea and vomiting.        2008 colonoscopy normal   Endocrine: Negative for polydipsia and polyuria.   Genitourinary: Negative for difficulty urinating, dysuria, frequency and hematuria.        Followed by Dr Cooper   Musculoskeletal: Positive for arthralgias. Negative for back  "pain, gait problem and myalgias.   Skin: Negative for rash and wound.   Neurological: Positive for dizziness. Negative for syncope, weakness and headaches.   Psychiatric/Behavioral: Negative for sleep disturbance. The patient is not nervous/anxious.        /74 (BP Location: Left arm, Patient Position: Sitting)   Pulse 72   Ht 172.7 cm (67.99\")   LMP  (LMP Unknown)       Physical Exam   Constitutional: She is oriented to person, place, and time. She appears well-developed and well-nourished.   HENT:   Head: Normocephalic and atraumatic.   Right Ear: External ear normal.   Left Ear: External ear normal.   Mouth/Throat: Oropharynx is clear and moist.   Eyes: Conjunctivae and EOM are normal.   Neck: Normal range of motion. Neck supple.   Cardiovascular: Normal rate, regular rhythm and normal heart sounds.    Pulmonary/Chest: Effort normal and breath sounds normal.   Abdominal: Soft. Bowel sounds are normal.   Musculoskeletal: Normal range of motion.   Lymphadenopathy:     She has no cervical adenopathy.   Neurological: She is alert and oriented to person, place, and time.   Skin: Skin is warm and dry.   Psychiatric: She has a normal mood and affect. Her behavior is normal. Thought content normal.       Procedure:      ECG 12 Lead  Date/Time: 8/8/2018 1:28 PM  Performed by: RAMIREZ CORTEZ  Authorized by: RAMIREZ CORTEZ   Comparison: not compared with previous ECG   Rhythm: sinus rhythm  Rate: normal  Conduction: conduction normal  ST Segments: ST segments normal  T Waves: T waves normal  QRS axis: normal  Other: no other findings  Clinical impression: normal ECG          Discussion/Summary:    Depression/anxiety-cymbalta continuation  joint pain-advised to limit narc  insomnia-cont prn ambien  htn/edema-stable  CP-?GERD, trial of PPI, notify if not better, labs today  Fatigue-labs today      labs noted and dw patient     Current Outpatient Prescriptions:   •  celecoxib (CeleBREX) 200 MG capsule, TAKE 1 " CAPSULE BY MOUTH ONCE DAILY, Disp: 90 capsule, Rfl: 0  •  diazePAM (VALIUM) 2 MG tablet, Take 1 tablet by mouth 2 (Two) Times a Day As Needed for Anxiety., Disp: 5 tablet, Rfl: 0  •  DULoxetine (CYMBALTA) 60 MG capsule, Take 1 capsule by mouth Daily., Disp: 90 capsule, Rfl: 1  •  ESTRING 2 MG vaginal ring, , Disp: , Rfl:   •  HYDROcodone-acetaminophen (NORCO) 7.5-325 MG per tablet, Take 0.5 tablets by mouth 2 (Two) Times a Day As Needed for Moderate Pain ., Disp: 30 tablet, Rfl: 0  •  promethazine (PHENERGAN) 25 MG tablet, Take 1 tablet by mouth Every 6 (Six) Hours As Needed for Nausea., Disp: 30 tablet, Rfl: 1  •  triamterene-hydrochlorothiazide (MAXZIDE-25) 37.5-25 MG per tablet, Take 1 tablet by mouth Daily., Disp: 90 tablet, Rfl: 2  •  zolpidem (AMBIEN) 10 MG tablet, TAKE ONE TABLET BY MOUTH AT BEDTIME AS NEEDED, Disp: 30 tablet, Rfl: 2  •  dexlansoprazole (DEXILANT) 60 MG capsule, Take 1 capsule by mouth Every Morning., Disp: 90 capsule, Rfl: 3  •  mupirocin (BACTROBAN) 2 % ointment, Apply  topically 2 (two) times a day. Apply sparingly to affected area., Disp: , Rfl:         Vanna was seen today for chest pain.    Diagnoses and all orders for this visit:    Essential hypertension    Anxiety    Other depression  -     Vitamin B12    Insomnia, unspecified type    Other fatigue  -     CBC (No Diff)  -     Comprehensive Metabolic Panel  -     TSH  -     Vitamin B12    Gastroesophageal reflux disease without esophagitis  -     dexlansoprazole (DEXILANT) 60 MG capsule; Take 1 capsule by mouth Every Morning.    Chest pain, unspecified type  -     Troponin T    Acute pain of right knee  -     HYDROcodone-acetaminophen (NORCO) 7.5-325 MG per tablet; Take 0.5 tablets by mouth 2 (Two) Times a Day As Needed for Moderate Pain .    Other orders  -     ECG 12 Lead

## 2018-08-09 LAB
ALBUMIN SERPL-MCNC: 4.78 G/DL (ref 3.2–4.8)
ALBUMIN/GLOB SERPL: 1.8 G/DL (ref 1.5–2.5)
ALP SERPL-CCNC: 75 U/L (ref 25–100)
ALT SERPL-CCNC: 26 U/L (ref 7–40)
AST SERPL-CCNC: 28 U/L (ref 0–33)
BILIRUB SERPL-MCNC: 0.6 MG/DL (ref 0.3–1.2)
BUN SERPL-MCNC: 16 MG/DL (ref 9–23)
BUN/CREAT SERPL: 17.6 (ref 7–25)
CALCIUM SERPL-MCNC: 9.9 MG/DL (ref 8.7–10.4)
CHLORIDE SERPL-SCNC: 101 MMOL/L (ref 99–109)
CO2 SERPL-SCNC: 26 MMOL/L (ref 20–31)
CREAT SERPL-MCNC: 0.91 MG/DL (ref 0.6–1.3)
ERYTHROCYTE [DISTWIDTH] IN BLOOD BY AUTOMATED COUNT: 12.6 % (ref 11.3–14.5)
GLOBULIN SER CALC-MCNC: 2.6 GM/DL
GLUCOSE SERPL-MCNC: 86 MG/DL (ref 70–100)
HCT VFR BLD AUTO: 42.1 % (ref 34.5–44)
HGB BLD-MCNC: 14.3 G/DL (ref 11.5–15.5)
MCH RBC QN AUTO: 33.1 PG (ref 27–31)
MCHC RBC AUTO-ENTMCNC: 34 G/DL (ref 32–36)
MCV RBC AUTO: 97.5 FL (ref 80–99)
PLATELET # BLD AUTO: 263 10*3/MM3 (ref 150–450)
POTASSIUM SERPL-SCNC: 4 MMOL/L (ref 3.5–5.5)
PROT SERPL-MCNC: 7.4 G/DL (ref 5.7–8.2)
RBC # BLD AUTO: 4.32 10*6/MM3 (ref 3.89–5.14)
SODIUM SERPL-SCNC: 149 MMOL/L (ref 132–146)
SPECIMEN STATUS: NORMAL
TROPONIN I SERPL-MCNC: 0.01 NG/ML
TROPONIN T SERPL-MCNC: NORMAL NG/ML
TSH SERPL DL<=0.005 MIU/L-ACNC: 1.76 MIU/ML (ref 0.35–5.35)
VIT B12 SERPL-MCNC: 743 PG/ML (ref 211–911)
WBC # BLD AUTO: 5.25 10*3/MM3 (ref 3.5–10.8)
WRITTEN AUTHORIZATION: NORMAL

## 2018-08-17 RX ORDER — PANTOPRAZOLE SODIUM 40 MG/1
40 TABLET, DELAYED RELEASE ORAL DAILY
Qty: 90 TABLET | Refills: 1 | Status: SHIPPED | OUTPATIENT
Start: 2018-08-17 | End: 2019-02-22 | Stop reason: SDUPTHER

## 2018-10-03 RX ORDER — CELECOXIB 200 MG/1
CAPSULE ORAL
Qty: 90 CAPSULE | Refills: 0 | Status: SHIPPED | OUTPATIENT
Start: 2018-10-03 | End: 2019-01-07 | Stop reason: SDUPTHER

## 2018-10-08 RX ORDER — TRIAMTERENE AND HYDROCHLOROTHIAZIDE 37.5; 25 MG/1; MG/1
TABLET ORAL
Qty: 90 TABLET | Refills: 2 | Status: SHIPPED | OUTPATIENT
Start: 2018-10-08 | End: 2019-07-29 | Stop reason: SDUPTHER

## 2018-11-19 ENCOUNTER — TELEPHONE (OUTPATIENT)
Dept: INTERNAL MEDICINE | Facility: CLINIC | Age: 53
End: 2018-11-19

## 2018-11-20 RX ORDER — SULFAMETHOXAZOLE AND TRIMETHOPRIM 400; 80 MG/1; MG/1
1 TABLET ORAL 2 TIMES DAILY
Qty: 6 TABLET | Refills: 0 | Status: SHIPPED | OUTPATIENT
Start: 2018-11-20 | End: 2019-01-10 | Stop reason: SDUPTHER
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
## 2019-01-07 ENCOUNTER — TELEPHONE (OUTPATIENT)
Dept: INTERNAL MEDICINE | Facility: CLINIC | Age: 54
End: 2019-01-07

## 2019-01-07 RX ORDER — CELECOXIB 200 MG/1
200 CAPSULE ORAL DAILY
Qty: 90 CAPSULE | Refills: 0 | Status: SHIPPED | OUTPATIENT
Start: 2019-01-07 | End: 2019-04-27 | Stop reason: SDUPTHER

## 2019-01-07 NOTE — TELEPHONE ENCOUNTER
Pt needs a refill on celebrex. Pt said that she is going out [place] the [place] and she needs the meds that you call in for her because she only has two left. Pt said can call in 20 pills. Pt said that the med was last called in around thanksgiving time

## 2019-01-10 ENCOUNTER — TELEPHONE (OUTPATIENT)
Dept: INTERNAL MEDICINE | Facility: CLINIC | Age: 54
End: 2019-01-10

## 2019-01-10 RX ORDER — SULFAMETHOXAZOLE AND TRIMETHOPRIM 400; 80 MG/1; MG/1
1 TABLET ORAL 2 TIMES DAILY
Qty: 14 TABLET | Refills: 0 | Status: SHIPPED | OUTPATIENT
Start: 2019-01-10 | End: 2019-02-14

## 2019-02-07 ENCOUNTER — TELEPHONE (OUTPATIENT)
Dept: INTERNAL MEDICINE | Facility: CLINIC | Age: 54
End: 2019-02-07

## 2019-02-07 RX ORDER — HYDROXYZINE HYDROCHLORIDE 25 MG/1
25 TABLET, FILM COATED ORAL EVERY 8 HOURS PRN
Qty: 30 TABLET | Refills: 1 | Status: SHIPPED | OUTPATIENT
Start: 2019-02-07 | End: 2022-05-23

## 2019-02-11 ENCOUNTER — TELEPHONE (OUTPATIENT)
Dept: INTERNAL MEDICINE | Facility: CLINIC | Age: 54
End: 2019-02-11

## 2019-02-14 ENCOUNTER — OFFICE VISIT (OUTPATIENT)
Dept: INTERNAL MEDICINE | Facility: CLINIC | Age: 54
End: 2019-02-14

## 2019-02-14 VITALS
HEIGHT: 68 IN | DIASTOLIC BLOOD PRESSURE: 74 MMHG | HEART RATE: 68 BPM | SYSTOLIC BLOOD PRESSURE: 120 MMHG | BODY MASS INDEX: 27.06 KG/M2

## 2019-02-14 DIAGNOSIS — G47.00 INSOMNIA, UNSPECIFIED TYPE: ICD-10-CM

## 2019-02-14 DIAGNOSIS — M25.561 ACUTE PAIN OF RIGHT KNEE: ICD-10-CM

## 2019-02-14 DIAGNOSIS — R53.83 OTHER FATIGUE: ICD-10-CM

## 2019-02-14 DIAGNOSIS — F32.89 OTHER DEPRESSION: ICD-10-CM

## 2019-02-14 DIAGNOSIS — I10 ESSENTIAL HYPERTENSION: Primary | ICD-10-CM

## 2019-02-14 DIAGNOSIS — F41.9 ANXIETY: ICD-10-CM

## 2019-02-14 PROCEDURE — 99214 OFFICE O/P EST MOD 30 MIN: CPT | Performed by: INTERNAL MEDICINE

## 2019-02-14 RX ORDER — HYDROCODONE BITARTRATE AND ACETAMINOPHEN 7.5; 325 MG/1; MG/1
0.5 TABLET ORAL 2 TIMES DAILY PRN
Qty: 30 TABLET | Refills: 0 | Status: SHIPPED | OUTPATIENT
Start: 2019-02-14 | End: 2019-05-31 | Stop reason: SDUPTHER

## 2019-02-14 RX ORDER — BUSPIRONE HYDROCHLORIDE 10 MG/1
10 TABLET ORAL 2 TIMES DAILY
Qty: 60 TABLET | Refills: 2 | Status: SHIPPED | OUTPATIENT
Start: 2019-02-14 | End: 2019-06-26 | Stop reason: SDUPTHER

## 2019-02-14 NOTE — PROGRESS NOTES
"Patient is a 53 y.o. female who is here to discuss med for anxiety.  Chief Complaint   Patient presents with   • Anxiety         HPI:    Here for f/u.  Over the past few months has \"more on her plate\" and very anxious.  Tried hydroxyzine but made her too groggy.  Sleep is ok.  Using Ambien on prn basis.  Feels good on cymbalta.    History:    Patient Active Problem List   Diagnosis   • Anxiety   • Depression   • Hypertension   • Insomnia   • Pain in joint   • H/O traveler's diarrhea   • Boil of scalp   • Acute pain of right knee   • Fatigue       Past Medical History:   Diagnosis Date   • Arthritis    • Cancer (CMS/HCC)     Skin   • Cervical dysplasia    • H/O colonoscopy    • Ovarian cancer (CMS/HCC)        Past Surgical History:   Procedure Laterality Date   • AUGMENTATION MAMMAPLASTY     • BREAST EXCISIONAL BIOPSY Left    • DIAGNOSTIC LAPAROSCOPY     • OOPHORECTOMY     • OTHER SURGICAL HISTORY      Facial surgery   • OTHER SURGICAL HISTORY      Leg repair   • TOTAL ABDOMINAL HYSTERECTOMY      removal of both ovaries       Current Outpatient Medications on File Prior to Visit   Medication Sig   • celecoxib (CeleBREX) 200 MG capsule Take 1 capsule by mouth Daily.   • DULoxetine (CYMBALTA) 60 MG capsule Take 1 capsule by mouth Daily.   • ESTRING 2 MG vaginal ring    • hydrOXYzine (ATARAX) 25 MG tablet Take 1 tablet by mouth Every 8 (Eight) Hours As Needed for Anxiety. May cause sleepiness   • pantoprazole (PROTONIX) 40 MG EC tablet Take 1 tablet by mouth Daily. D/c dexilant   • promethazine (PHENERGAN) 25 MG tablet Take 1 tablet by mouth Every 6 (Six) Hours As Needed for Nausea.   • triamterene-hydrochlorothiazide (MAXZIDE-25) 37.5-25 MG per tablet TAKE ONE TABLET BY MOUTH ONCE DAILY   • zolpidem (AMBIEN) 10 MG tablet TAKE ONE TABLET BY MOUTH AT BEDTIME AS NEEDED   • [DISCONTINUED] HYDROcodone-acetaminophen (NORCO) 7.5-325 MG per tablet Take 0.5 tablets by mouth 2 (Two) Times a Day As Needed for Moderate Pain .   • " [DISCONTINUED] diazePAM (VALIUM) 2 MG tablet Take 1 tablet by mouth 2 (Two) Times a Day As Needed for Anxiety.   • [DISCONTINUED] mupirocin (BACTROBAN) 2 % ointment Apply  topically to the appropriate area as directed 2 (Two) Times a Day. Apply sparingly to affected area.   • [DISCONTINUED] sulfamethoxazole-trimethoprim (BACTRIM) 400-80 MG tablet Take 1 tablet by mouth 2 (Two) Times a Day.     No current facility-administered medications on file prior to visit.        Family History   Problem Relation Age of Onset   • Mental illness Mother         mental disorder   • Breast cancer Mother 54   • Hypertension Father    • Heart attack Father        Social History     Socioeconomic History   • Marital status:      Spouse name: Not on file   • Number of children: Not on file   • Years of education: Not on file   • Highest education level: Not on file   Social Needs   • Financial resource strain: Not on file   • Food insecurity - worry: Not on file   • Food insecurity - inability: Not on file   • Transportation needs - medical: Not on file   • Transportation needs - non-medical: Not on file   Occupational History   • Not on file   Tobacco Use   • Smoking status: Never Smoker   • Smokeless tobacco: Never Used   Substance and Sexual Activity   • Alcohol use: Yes   • Drug use: No   • Sexual activity: Defer   Other Topics Concern   • Not on file   Social History Narrative   • Not on file         Review of Systems   Constitutional: Positive for fatigue. Negative for chills, diaphoresis and fever.   HENT: Negative for ear pain, hearing loss, nosebleeds, postnasal drip and sinus pressure.    Eyes: Negative for pain, discharge and itching.   Respiratory: Negative for chest tightness, shortness of breath and wheezing.    Cardiovascular: Negative for palpitations and leg swelling.   Gastrointestinal: Negative for abdominal distention, abdominal pain, blood in stool, constipation, diarrhea and vomiting.        2008  "colonoscopy normal   Endocrine: Negative for polydipsia and polyuria.   Genitourinary: Negative for difficulty urinating, dysuria, frequency and hematuria.        Followed by Dr Cooper   Musculoskeletal: Positive for arthralgias. Negative for back pain, gait problem and myalgias.   Skin: Negative for rash and wound.   Neurological: Negative for syncope, weakness and headaches.   Psychiatric/Behavioral: Negative for sleep disturbance. The patient is not nervous/anxious.        /74   Pulse 68   Ht 172.7 cm (67.99\")   LMP  (LMP Unknown)   BMI 27.06 kg/m²       Physical Exam   Constitutional: She is oriented to person, place, and time. She appears well-developed and well-nourished.   HENT:   Head: Normocephalic and atraumatic.   Right Ear: External ear normal.   Left Ear: External ear normal.   Mouth/Throat: Oropharynx is clear and moist.   Eyes: Conjunctivae and EOM are normal.   Neck: Normal range of motion. Neck supple.   Cardiovascular: Normal rate, regular rhythm and normal heart sounds.   Pulmonary/Chest: Effort normal and breath sounds normal.   Abdominal: Soft. Bowel sounds are normal.   Musculoskeletal: Normal range of motion.   Lymphadenopathy:     She has no cervical adenopathy.   Neurological: She is alert and oriented to person, place, and time.   Skin: Skin is warm and dry.   Psychiatric: She has a normal mood and affect. Her behavior is normal. Thought content normal.       Procedure:      Discussion/Summary:    Depression/anxiety-cymbalta continuation  joint pain-advised to limit narc  insomnia-cont prn ambien  htn/edema-stable  Situational anxiety-rx Buspar and will use 1/2 of hydroxyzine prn  Fatigue-labs noted      labs noted and dw patient         Current Outpatient Medications:   •  celecoxib (CeleBREX) 200 MG capsule, Take 1 capsule by mouth Daily., Disp: 90 capsule, Rfl: 0  •  DULoxetine (CYMBALTA) 60 MG capsule, Take 1 capsule by mouth Daily., Disp: 90 capsule, Rfl: 1  •  ESTRING 2 MG " vaginal ring, , Disp: , Rfl:   •  HYDROcodone-acetaminophen (NORCO) 7.5-325 MG per tablet, Take 0.5 tablets by mouth 2 (Two) Times a Day As Needed for Moderate Pain ., Disp: 30 tablet, Rfl: 0  •  hydrOXYzine (ATARAX) 25 MG tablet, Take 1 tablet by mouth Every 8 (Eight) Hours As Needed for Anxiety. May cause sleepiness, Disp: 30 tablet, Rfl: 1  •  pantoprazole (PROTONIX) 40 MG EC tablet, Take 1 tablet by mouth Daily. D/c dexilant, Disp: 90 tablet, Rfl: 1  •  promethazine (PHENERGAN) 25 MG tablet, Take 1 tablet by mouth Every 6 (Six) Hours As Needed for Nausea., Disp: 30 tablet, Rfl: 1  •  triamterene-hydrochlorothiazide (MAXZIDE-25) 37.5-25 MG per tablet, TAKE ONE TABLET BY MOUTH ONCE DAILY, Disp: 90 tablet, Rfl: 2  •  zolpidem (AMBIEN) 10 MG tablet, TAKE ONE TABLET BY MOUTH AT BEDTIME AS NEEDED, Disp: 30 tablet, Rfl: 2  •  busPIRone (BUSPAR) 10 MG tablet, Take 1 tablet by mouth 2 (Two) Times a Day., Disp: 60 tablet, Rfl: 2        Vanna was seen today for anxiety.    Diagnoses and all orders for this visit:    Essential hypertension    Insomnia, unspecified type    Other fatigue    Other depression    Anxiety    Acute pain of right knee  -     HYDROcodone-acetaminophen (NORCO) 7.5-325 MG per tablet; Take 0.5 tablets by mouth 2 (Two) Times a Day As Needed for Moderate Pain .    Other orders  -     busPIRone (BUSPAR) 10 MG tablet; Take 1 tablet by mouth 2 (Two) Times a Day.

## 2019-02-25 RX ORDER — PANTOPRAZOLE SODIUM 40 MG/1
TABLET, DELAYED RELEASE ORAL
Qty: 90 TABLET | Refills: 1 | Status: SHIPPED | OUTPATIENT
Start: 2019-02-25 | End: 2019-08-29 | Stop reason: SDUPTHER

## 2019-04-29 RX ORDER — CELECOXIB 200 MG/1
CAPSULE ORAL
Qty: 90 CAPSULE | Refills: 0 | Status: SHIPPED | OUTPATIENT
Start: 2019-04-29 | End: 2019-07-25

## 2019-05-14 ENCOUNTER — TELEPHONE (OUTPATIENT)
Dept: INTERNAL MEDICINE | Facility: CLINIC | Age: 54
End: 2019-05-14
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
## 2019-05-16 NOTE — TELEPHONE ENCOUNTER
Pt came by the office this morning to drop off a note from [facility] for Dr. Nielsen to review and she also signed a release to get the rest of the records Dr. Nielsen needed to review. I advised her we would not be able to prescribe her anything until he gets back from vacation and reviews the records. (Pt already knew he was leaving to go on vacation and was advised by previous voicemail we needed them before he left.) When I told pt. That she started getting loud and saying well that is not until the end of the month. She said you don't have another doctor. I was explaining to her we can't just give medicine out without his approval. And I started trying to explain to her that this would be something new we would be prescribing for a new problem and another provider wouldn't just write that. She said she had been a pt of his for a long time and she didn't understand. 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 She interrupted me and said you really want to help me and stormed out of the office. She then called back a few mins. later and asked to speak to me. I got on the phone and she just said take that paper and shred it I don't need your help and hung up. I then did not send release and shredded that.

## 2019-05-23 DIAGNOSIS — F32.89 OTHER DEPRESSION: ICD-10-CM

## 2019-05-23 RX ORDER — DULOXETIN HYDROCHLORIDE 60 MG/1
CAPSULE, DELAYED RELEASE ORAL
Qty: 90 CAPSULE | Refills: 1 | Status: SHIPPED | OUTPATIENT
Start: 2019-05-23 | End: 2019-12-06 | Stop reason: SDUPTHER

## 2019-05-31 ENCOUNTER — TELEPHONE (OUTPATIENT)
Dept: INTERNAL MEDICINE | Facility: CLINIC | Age: 54
End: 2019-05-31

## 2019-05-31 DIAGNOSIS — M25.561 ACUTE PAIN OF RIGHT KNEE: ICD-10-CM

## 2019-05-31 RX ORDER — HYDROCODONE BITARTRATE AND ACETAMINOPHEN 7.5; 325 MG/1; MG/1
0.5 TABLET ORAL 2 TIMES DAILY PRN
Qty: 30 TABLET | Refills: 0 | Status: SHIPPED | OUTPATIENT
Start: 2019-05-31 | End: 2019-10-21
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
## 2019-06-26 RX ORDER — BUSPIRONE HYDROCHLORIDE 10 MG/1
TABLET ORAL
Qty: 60 TABLET | Refills: 2 | Status: SHIPPED | OUTPATIENT
Start: 2019-06-26 | End: 2021-07-19

## 2019-07-25 ENCOUNTER — TELEPHONE (OUTPATIENT)
Dept: INTERNAL MEDICINE | Facility: CLINIC | Age: 54
End: 2019-07-25

## 2019-07-25 RX ORDER — NABUMETONE 500 MG/1
500 TABLET, FILM COATED ORAL 2 TIMES DAILY PRN
Qty: 60 TABLET | Refills: 1 | Status: SHIPPED | OUTPATIENT
Start: 2019-07-25 | End: 2019-11-19 | Stop reason: SDUPTHER

## 2019-07-25 NOTE — TELEPHONE ENCOUNTER
Patient called wanting to be taken off celebrex she is wanting to know if she can try relafen? Please give pt a call

## 2019-07-29 RX ORDER — TRIAMTERENE AND HYDROCHLOROTHIAZIDE 37.5; 25 MG/1; MG/1
TABLET ORAL
Qty: 90 TABLET | Refills: 2 | Status: SHIPPED | OUTPATIENT
Start: 2019-07-29 | End: 2020-05-07

## 2019-08-29 RX ORDER — PANTOPRAZOLE SODIUM 40 MG/1
TABLET, DELAYED RELEASE ORAL
Qty: 90 TABLET | Refills: 1 | Status: SHIPPED | OUTPATIENT
Start: 2019-08-29 | End: 2020-02-28 | Stop reason: SDUPTHER

## 2019-09-03 ENCOUNTER — TRANSCRIBE ORDERS (OUTPATIENT)
Dept: ADMINISTRATIVE | Facility: HOSPITAL | Age: 54
End: 2019-09-03

## 2019-09-03 DIAGNOSIS — Z12.31 VISIT FOR SCREENING MAMMOGRAM: Primary | ICD-10-CM

## 2019-09-04 ENCOUNTER — OFFICE VISIT (OUTPATIENT)
Dept: ORTHOPEDIC SURGERY | Facility: CLINIC | Age: 54
End: 2019-09-04

## 2019-09-04 VITALS — HEIGHT: 68 IN | HEART RATE: 89 BPM | BODY MASS INDEX: 26.96 KG/M2 | OXYGEN SATURATION: 98 % | WEIGHT: 177.91 LBS

## 2019-09-04 DIAGNOSIS — M25.561 ACUTE PAIN OF RIGHT KNEE: Primary | ICD-10-CM

## 2019-09-04 DIAGNOSIS — S83.411A SPRAIN OF MEDIAL COLLATERAL LIGAMENT OF RIGHT KNEE, INITIAL ENCOUNTER: ICD-10-CM

## 2019-09-04 PROCEDURE — 99214 OFFICE O/P EST MOD 30 MIN: CPT | Performed by: ORTHOPAEDIC SURGERY

## 2019-09-04 NOTE — PROGRESS NOTES
[facility] Note    Subjective     Chief Complaint   Patient presents with   • Right Knee - Follow-up, Pain     Fell downstairs at home. DOI: 08/08/2019    Right Knee Arthroscopy Partial Lateral Meniscectomy 5/18/17        HPI  Vanna Black is a 54 y.o. female.  She injured her right knee on August 8, 2018.  She went to [facility].  She is here for follow-up.  I scoped the same knee in 2017.  She test for her  in karate on the 21st.  Her pain is 10 out of 10 dull and stabbing.  I read her [facility] report some unsafe in summary she had a right knee injury and sprain    Past Medical History:   Diagnosis Date   • Arthritis    • Cancer (CMS/HCC)     Skin   • Cervical dysplasia    • H/O colonoscopy    • Ovarian cancer (CMS/HCC)       Past Surgical History:   Procedure Laterality Date   • AUGMENTATION MAMMAPLASTY     • BREAST EXCISIONAL BIOPSY Left    • DIAGNOSTIC LAPAROSCOPY     • OOPHORECTOMY     • OTHER SURGICAL HISTORY      Facial surgery   • OTHER SURGICAL HISTORY      Leg repair   • TOTAL ABDOMINAL HYSTERECTOMY      removal of both ovaries      Family History   Problem Relation Age of Onset   • Mental illness Mother         mental disorder   • Breast cancer Mother 54   • Hypertension Father    • Heart attack Father      Social History     Socioeconomic History   • Marital status:      Spouse name: Not on file   • Number of children: Not on file   • Years of education: Not on file   • Highest education level: Not on file   Tobacco Use   • Smoking status: Never Smoker   • Smokeless tobacco: Never Used   Substance and Sexual Activity   • Alcohol use: Yes   • Drug use: No   • Sexual activity: Defer      Current Outpatient Medications on File Prior to Visit   Medication Sig Dispense Refill   • busPIRone (BUSPAR) 10 MG tablet TAKE 1 TABLET BY MOUTH TWICE DAILY 60 tablet 2   • DULoxetine (CYMBALTA) 60 MG capsule TAKE 1 CAPSULE BY MOUTH ONCE DAILY 90 capsule 1   • ESTRING 2  "MG vaginal ring      • HYDROcodone-acetaminophen (NORCO) 7.5-325 MG per tablet Take 0.5 tablets by mouth 2 (Two) Times a Day As Needed for Moderate Pain . 30 tablet 0   • hydrOXYzine (ATARAX) 25 MG tablet Take 1 tablet by mouth Every 8 (Eight) Hours As Needed for Anxiety. May cause sleepiness 30 tablet 1   • nabumetone (RELAFEN) 500 MG tablet Take 1 tablet by mouth 2 (Two) Times a Day As Needed for Mild Pain . 60 tablet 1   • pantoprazole (PROTONIX) 40 MG EC tablet TAKE 1 TABLET BY MOUTH ONCE DAILY 90 tablet 1   • triamterene-hydrochlorothiazide (MAXZIDE-25) 37.5-25 MG per tablet TAKE 1 TABLET BY MOUTH ONCE DAILY 90 tablet 2   • zolpidem (AMBIEN) 10 MG tablet TAKE ONE TABLET BY MOUTH AT BEDTIME AS NEEDED 30 tablet 2   • [DISCONTINUED] promethazine (PHENERGAN) 25 MG tablet Take 1 tablet by mouth Every 6 (Six) Hours As Needed for Nausea. 30 tablet 1     No current facility-administered medications on file prior to visit.       No Known Allergies     The following portions of the patient's history were reviewed and updated as appropriate: allergies, current medications, past family history, past medical history, past social history, past surgical history and problem list.    Review of Systems   Constitutional: Negative.    HENT: Negative.    Eyes: Negative.    Respiratory: Negative.    Cardiovascular: Negative.    Gastrointestinal: Negative.    Endocrine: Negative.    Genitourinary: Negative.    Musculoskeletal: Positive for arthralgias.   Skin: Negative.    Allergic/Immunologic: Negative.    Neurological: Negative.    Hematological: Negative.    Psychiatric/Behavioral: Positive for sleep disturbance.        Objective      Physical Exam  Pulse 89   Ht 172.7 cm (67.99\")   Wt 80.7 kg (177 lb 14.6 oz)   LMP  (LMP Unknown)   SpO2 98%   Breastfeeding? No   BMI 27.06 kg/m²     Body mass index is 27.06 kg/m².    GENERAL APPEARANCE: awake, alert & oriented x 3, in no acute distress and well developed, well " nourished  PSYCH: normal mood and affect  LUNGS:  breathing nonlabored, no wheezing  EYES: sclera anicteric, pupils equal  CARDIOVASCULAR: palpable pulses dorsalis pedis, palpable posterior tibial bilaterally. Capillary refill less than 2 seconds  INTEGUMENTARY: skin intact, no clubbing, cyanosis  NEUROLOGIC:  Normal Sensation and reflexes       Ortho Exam  Peripheral Vascular:    Upper Extremity:   Inspection:  Left--no cyanotic nail beds Right--no cyanotic nail beds   Bilateral:  Pink nail beds with brisk capillary refill   Palpation:  Bilateral radial pulse normal  Musculoskeletal:  Global Assessment:  Overall assessment of Lower Extremity Muscle Strength and Tone:  Right quadriceps--5/5  Right hamstrings--5/5  Right tibialis anterior--5/5  Right gastroc soleus--5/5  Right EHL--5/5  Lower Extremity:  Knee/Patella:  No digital clubbing or cyanosis.    Examination of right knee reveals:  Normal deep tendon reflexes, coordination, strength, tone, sensation.  No known fractures or deformities.  Inspection and Palpation:    Right knee:  Tenderness: No joint line and MCL  Effusion: Trace  Crepitus:  none  Pulses:  2+  Ecchymosis:  None  Warmth:  None   ROM:  Right:  Extension:0    Flexion:135  Left:  Extension:0     Flexion:135  Instability:  Right:  Lachman Test:  Negative, Varus stress test negative,   Valgus stress test +1+, Posterior Drawer Test:  Negative  Deformities/Malalignments/Discrepancies:    Left:  none  Right:  none  Functional Testing:  Right:  Peyton's test:  Negative  Patella grind test:  Negative  Q-angle:  Normal  Apprehension Sign:  Negative        Imaging/Studies  Imaging Results (last 7 days)     ** No results found for the last 168 hours. **      I viewed her x-rays from August 8 which are negative    Assessment/Plan        ICD-10-CM ICD-9-CM   1. Acute pain of right knee M25.561 719.46   2. Sprain of medial collateral ligament of right knee, initial encounter S83.411A 844.1       Orders Placed  This Encounter   Procedures   • MRI Knee Right Without Contrast      I ordered a stat MRI.  I am concerned she has a ligament injury in addition to a meniscus tear.  I want her to go back in a hinged knee brace and we will see her back after the MRI.    Medical Decision Making  Management Options : over-the-counter medicine  Data/Risk: radiology tests, summary of old records and independent visualization of imaging, lab tests, or EMG/NCV    Joe Burrell MD  09/04/19  2:28 PM         EMR Dragon/Transcription disclaimer:  Much of this encounter note is an electronic transcription of spoken language to printed text. Electronic transcription of spoken language may permit erroneous, or at times, nonsensical words or phrases to be inadvertently transcribed. Although I have reviewed the note for such errors, some may still exist.

## 2019-09-06 DIAGNOSIS — M25.561 ACUTE PAIN OF RIGHT KNEE: ICD-10-CM

## 2019-09-09 ENCOUNTER — OFFICE VISIT (OUTPATIENT)
Dept: ORTHOPEDIC SURGERY | Facility: CLINIC | Age: 54
End: 2019-09-09

## 2019-09-09 VITALS — OXYGEN SATURATION: 98 % | HEIGHT: 68 IN | BODY MASS INDEX: 26.96 KG/M2 | HEART RATE: 86 BPM | WEIGHT: 177.91 LBS

## 2019-09-09 DIAGNOSIS — M76.891 TENDINITIS OF RIGHT QUADRICEPS TENDON: ICD-10-CM

## 2019-09-09 DIAGNOSIS — S83.271D COMPLEX TEAR OF LATERAL MENISCUS OF RIGHT KNEE AS CURRENT INJURY, SUBSEQUENT ENCOUNTER: ICD-10-CM

## 2019-09-09 DIAGNOSIS — M23.203 OLD COMPLEX TEAR OF MEDIAL MENISCUS OF RIGHT KNEE: ICD-10-CM

## 2019-09-09 DIAGNOSIS — M17.11 PRIMARY OSTEOARTHRITIS OF RIGHT KNEE: ICD-10-CM

## 2019-09-09 DIAGNOSIS — S83.411D SPRAIN OF MEDIAL COLLATERAL LIGAMENT OF RIGHT KNEE, SUBSEQUENT ENCOUNTER: Primary | ICD-10-CM

## 2019-09-09 PROCEDURE — 99214 OFFICE O/P EST MOD 30 MIN: CPT | Performed by: ORTHOPAEDIC SURGERY

## 2019-09-09 NOTE — PROGRESS NOTES
[facility]    Subjective     Chief Complaint   Patient presents with   • Right Knee - Follow-up, Pain     Post MRI   DOI: 08/08/2019        HPI  Vanna Black is a 54 y.o. female.  She is follow-up right knee injury from August 8, 2019.  Her knee pain is 9 out of 10.  It is dull aching stabbing.  It feels like it did when she had a scope in 2017 but is getting worse not better.  She has done some physical therapy.  She has had a hinged knee brace.    Past Medical History:   Diagnosis Date   • Arthritis    • Cancer (CMS/HCC)     Skin   • Cervical dysplasia    • H/O colonoscopy    • Ovarian cancer (CMS/HCC)       Past Surgical History:   Procedure Laterality Date   • AUGMENTATION MAMMAPLASTY     • BREAST EXCISIONAL BIOPSY Left    • DIAGNOSTIC LAPAROSCOPY     • OOPHORECTOMY     • OTHER SURGICAL HISTORY      Facial surgery   • OTHER SURGICAL HISTORY      Leg repair   • TOTAL ABDOMINAL HYSTERECTOMY      removal of both ovaries      Family History   Problem Relation Age of Onset   • Mental illness Mother         mental disorder   • Breast cancer Mother 54   • Hypertension Father    • Heart attack Father      Social History     Socioeconomic History   • Marital status:      Spouse name: Not on file   • Number of children: Not on file   • Years of education: Not on file   • Highest education level: Not on file   Tobacco Use   • Smoking status: Never Smoker   • Smokeless tobacco: Never Used   Substance and Sexual Activity   • Alcohol use: Yes   • Drug use: No   • Sexual activity: Defer      Current Outpatient Medications on File Prior to Visit   Medication Sig Dispense Refill   • busPIRone (BUSPAR) 10 MG tablet TAKE 1 TABLET BY MOUTH TWICE DAILY 60 tablet 2   • DULoxetine (CYMBALTA) 60 MG capsule TAKE 1 CAPSULE BY MOUTH ONCE DAILY 90 capsule 1   • ESTRING 2 MG vaginal ring      • HYDROcodone-acetaminophen (NORCO) 7.5-325 MG per tablet Take 0.5 tablets by mouth 2 (Two) Times a Day As  "Needed for Moderate Pain . 30 tablet 0   • hydrOXYzine (ATARAX) 25 MG tablet Take 1 tablet by mouth Every 8 (Eight) Hours As Needed for Anxiety. May cause sleepiness 30 tablet 1   • nabumetone (RELAFEN) 500 MG tablet Take 1 tablet by mouth 2 (Two) Times a Day As Needed for Mild Pain . 60 tablet 1   • pantoprazole (PROTONIX) 40 MG EC tablet TAKE 1 TABLET BY MOUTH ONCE DAILY 90 tablet 1   • triamterene-hydrochlorothiazide (MAXZIDE-25) 37.5-25 MG per tablet TAKE 1 TABLET BY MOUTH ONCE DAILY 90 tablet 2   • zolpidem (AMBIEN) 10 MG tablet TAKE ONE TABLET BY MOUTH AT BEDTIME AS NEEDED 30 tablet 2     No current facility-administered medications on file prior to visit.       No Known Allergies     The following portions of the patient's history were reviewed and updated as appropriate: allergies, current medications, past family history, past medical history, past social history, past surgical history and problem list.    Review of Systems   Constitutional: Negative.    HENT: Negative.    Eyes: Negative.    Respiratory: Negative.    Cardiovascular: Negative.    Gastrointestinal: Negative.    Endocrine: Negative.    Genitourinary: Negative.    Musculoskeletal: Positive for arthralgias.   Skin: Negative.    Allergic/Immunologic: Negative.    Neurological: Negative.    Hematological: Negative.    Psychiatric/Behavioral: Positive for sleep disturbance.        Objective      Physical Exam  Pulse 86   Ht 172.7 cm (67.99\")   Wt 80.7 kg (177 lb 14.6 oz)   LMP  (LMP Unknown)   SpO2 98%   Breastfeeding? No   BMI 27.06 kg/m²     Body mass index is 27.06 kg/m².    GENERAL APPEARANCE: awake, alert & oriented x 3, in no acute distress and well developed, well nourished  PSYCH: normal mood and affect    Peripheral Vascular:    Upper Extremity:   Inspection:  Left--no cyanotic nail beds Right--no cyanotic nail beds   Bilateral:  Pink nail beds with brisk capillary refill   Palpation:  Bilateral radial pulse " normal  Musculoskeletal:  Global Assessment:  Overall assessment of Lower Extremity Muscle Strength and Tone:  Right quadriceps--5/5  Right hamstrings--5/5  Right tibialis anterior--5/5  Right gastroc soleus--5/5  Right EHL--5/5  Lower Extremity:  Knee/Patella:  No digital clubbing or cyanosis.    Examination of right knee reveals:  Normal deep tendon reflexes, coordination, strength, tone, sensation.  No known fractures or deformities.  Inspection and Palpation:    Right knee:  Tenderness: Medial joint line and MCL  Effusion:  none  Crepitus:  none  Pulses:  2+  Ecchymosis:  None  Warmth:  None   ROM:  Right:  Extension:0    Flexion:135  Left:  Extension:0     Flexion:135  Instability:  Right:  Lachman Test:  Negative, Varus stress test negative,   Valgus stress test +1+ with pain, Posterior Drawer Test:  Negative  Deformities/Malalignments/Discrepancies:    Left:  none  Right:  none  Functional Testing:  Right:  Peyton's test:  Negative  Patella grind test:  Negative  Q-angle:  Normal  Apprehension Sign:  Negative        Imaging/Studies  Imaging Results (last 7 days)     ** No results found for the last 168 hours. **      I viewed her MRI from September 5 which shows medial lateral meniscus tearing several articular cartilage defects MCL sprain and quadriceps tendinopathy    Assessment/Plan        ICD-10-CM ICD-9-CM   1. Sprain of medial collateral ligament of right knee, subsequent encounter S83.411D V58.89     844.1   2. Old complex tear of medial meniscus of right knee M23.203 717.3   3. Complex tear of lateral meniscus of right knee as current injury, subsequent encounter S83.271D V58.89   4. Primary osteoarthritis of right knee M17.11 715.16   5. Tendinitis of right quadriceps tendon M76.891 727.09     She has multiple issues with her right knee.  First her MCL needs to heal.  She needs to complete 6 weeks in a hinged knee brace.  She is anxious to get better and would like to proceed with right knee  arthroscopy because it helped her before.  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  The quadriceps tendinitis will heal with physical therapy anti-inflammatories and time.  Eventually she will need a knee replacement but hopefully for not another 10 to 20 years.  We will give her 2 more weeks for the MCL to tighten up and heal and then proceed with right knee arthroscopy to address the medial lateral meniscus tears and articular cartilage chondroplasty.  Treatment options and alternatives were discussed with patient.  Surgical risks include but are not limited to pain, bleeding, infection, failure to relieve symptoms, need for further procedures, recurrence of symptoms, damage to healthy adjacent structures, hardware loosening/failure, stiffness, weakness, scar, blood clots/DVT/PE, loss of limb or life. We also discussed the postoperative protocol and expected outcome although no guarantees are possible with surgery. All questions were answered; the patient would like to proceed with surgical intervention.  Medical Decision Making  Management Options : over-the-counter medicine and major surgery with risk factors  Data/Risk: radiology tests and independent visualization of imaging, lab tests, or EMG/NCV    Joe Burrell MD  09/09/19  1:28 PM         EMR Dragon/Transcription disclaimer:  Much of this encounter note is an electronic transcription of spoken language to printed text. Electronic transcription of spoken language may permit erroneous, or at times, nonsensical words or phrases to be inadvertently transcribed. Although I have reviewed the note for such errors, some may still exist.

## 2019-09-24 ENCOUNTER — OUTSIDE FACILITY SERVICE (OUTPATIENT)
Dept: ORTHOPEDIC SURGERY | Facility: CLINIC | Age: 54
End: 2019-09-24

## 2019-09-24 ENCOUNTER — LAB REQUISITION (OUTPATIENT)
Dept: LAB | Facility: HOSPITAL | Age: 54
End: 2019-09-24

## 2019-09-24 DIAGNOSIS — S83.271D COMPLEX TEAR OF LATERAL MENISCUS, CURRENT INJURY, RIGHT KNEE, SUBSEQUENT ENCOUNTER: ICD-10-CM

## 2019-09-24 LAB — POTASSIUM BLD-SCNC: 3.9 MMOL/L (ref 3.5–5.2)

## 2019-09-24 PROCEDURE — 84132 ASSAY OF SERUM POTASSIUM: CPT | Performed by: ORTHOPAEDIC SURGERY

## 2019-09-24 PROCEDURE — 29880 ARTHRS KNE SRG MNISECTMY M&L: CPT | Performed by: ORTHOPAEDIC SURGERY

## 2019-09-25 ENCOUNTER — TELEPHONE (OUTPATIENT)
Dept: ORTHOPEDIC SURGERY | Facility: CLINIC | Age: 54
End: 2019-09-25

## 2019-09-25 DIAGNOSIS — M23.203 OLD COMPLEX TEAR OF MEDIAL MENISCUS OF RIGHT KNEE: Primary | ICD-10-CM

## 2019-09-25 NOTE — TELEPHONE ENCOUNTER
Called patient and let her know that Dr Burrell went ahead and put order in so if she wants to get started she can. She will contact therapy.     Aure

## 2019-09-26 ENCOUNTER — TELEPHONE (OUTPATIENT)
Dept: ORTHOPEDIC SURGERY | Facility: CLINIC | Age: 54
End: 2019-09-26

## 2019-09-26 NOTE — TELEPHONE ENCOUNTER
PATIENT CALLED REQUESTING ANOTHER PRESCRIPTION FOR PAIN MEDICATION. SHE HAD SURGERY ON Tuesday AND IS STILL IN SEVERE PAIN. SHE W[facility] LIKE [facility] S[facility]. SHE CAN BE REACHED [phone].

## 2019-09-26 NOTE — TELEPHONE ENCOUNTER
Will you refill or give her anything else? She cannot take NSAIDs due to arthritis medicine she is currently on.    Aure

## 2019-09-27 NOTE — TELEPHONE ENCOUNTER
Called patient back and let her know to take the tylenol and rest/ice elevate the leg to help with swelling. She understood and will call back if things do not get better.Aure

## 2019-09-30 ENCOUNTER — OFFICE VISIT (OUTPATIENT)
Dept: ORTHOPEDIC SURGERY | Facility: CLINIC | Age: 54
End: 2019-09-30

## 2019-09-30 DIAGNOSIS — Z98.890 STATUS POST MEDIAL MENISCECTOMY OF RIGHT KNEE: Primary | ICD-10-CM

## 2019-09-30 PROCEDURE — 99024 POSTOP FOLLOW-UP VISIT: CPT | Performed by: ORTHOPAEDIC SURGERY

## 2019-09-30 NOTE — PROGRESS NOTES
Chief Complaint   Patient presents with   • Post-op     6 days status post Right Kneee Arthroscopy and Partial Medial & Lateral Meniscectomies 09/24/2019           HPI  Is doing better follow-up right knee MCL sprain with status post partial medial lateral meniscectomies on September 24.      There were no vitals filed for this visit.      Physical Exam:  Her portals are good.  Negative Homans sign.  Trace effusion.  Near full range of motion.      X-RAY REPORT:  Imaging Results (last 7 days)     ** No results found for the last 168 hours. **                ICD-10-CM ICD-9-CM   1. Status post medial meniscectomy of right knee Z98.890 V45.89       Orders Placed This Encounter   Procedures   • Ambulatory Referral to Physical Therapy     She will go to physical therapy.  Weight-bear as tolerated.  Baby aspirin daily for DVT prophylaxis and follow-up in 3 weeks.  She may advance activity as tolerated.

## 2019-10-21 ENCOUNTER — OFFICE VISIT (OUTPATIENT)
Dept: ORTHOPEDIC SURGERY | Facility: CLINIC | Age: 54
End: 2019-10-21

## 2019-10-21 DIAGNOSIS — Z98.890 STATUS POST MEDIAL MENISCECTOMY OF RIGHT KNEE: Primary | ICD-10-CM

## 2019-10-21 PROCEDURE — 99024 POSTOP FOLLOW-UP VISIT: CPT | Performed by: ORTHOPAEDIC SURGERY

## 2019-10-21 NOTE — PROGRESS NOTES
Chief Complaint   Patient presents with   • Post-op     4 weeks status post Right Kneee Arthroscopy and Partial Medial & Lateral Meniscectomies 09/24/2019           HPI  She is doing well follow-up right knee scope she had a partial meniscectomy.  She had an MCL sprain that is healed.      There were no vitals filed for this visit.      Physical Exam:  Her knee looks great.  She walks with a normal gait negative Homans sign.  Stable ligaments.      X-RAY REPORT:  Imaging Results (last 7 days)     ** No results found for the last 168 hours. **                ICD-10-CM ICD-9-CM   1. Status post medial meniscectomy of right knee Z98.890 V45.89     She will finish physical therapy and follow-up as needed.  She is doing very well.  She plans to return to martial arts.

## 2019-10-31 ENCOUNTER — HOSPITAL ENCOUNTER (OUTPATIENT)
Dept: MAMMOGRAPHY | Facility: HOSPITAL | Age: 54
Discharge: HOME | End: 2019-10-31
Admitting: NURSE PRACTITIONER

## 2019-10-31 DIAGNOSIS — Z12.31 VISIT FOR SCREENING MAMMOGRAM: ICD-10-CM

## 2019-10-31 PROCEDURE — 77067 SCR MAMMO BI INCL CAD: CPT | Performed by: RADIOLOGY

## 2019-10-31 PROCEDURE — 77067 SCR MAMMO BI INCL CAD: CPT

## 2019-10-31 PROCEDURE — 77063 BREAST TOMOSYNTHESIS BI: CPT | Performed by: RADIOLOGY

## 2019-10-31 PROCEDURE — 77063 BREAST TOMOSYNTHESIS BI: CPT

## 2019-11-19 RX ORDER — NABUMETONE 500 MG/1
TABLET, FILM COATED ORAL
Qty: 60 TABLET | Refills: 1 | Status: SHIPPED | OUTPATIENT
Start: 2019-11-19 | End: 2020-03-03

## 2019-12-06 DIAGNOSIS — F32.89 OTHER DEPRESSION: ICD-10-CM

## 2019-12-06 RX ORDER — DULOXETIN HYDROCHLORIDE 60 MG/1
CAPSULE, DELAYED RELEASE ORAL
Qty: 90 CAPSULE | Refills: 1 | Status: SHIPPED | OUTPATIENT
Start: 2019-12-06 | End: 2020-02-28 | Stop reason: SDUPTHER

## 2020-02-28 DIAGNOSIS — F32.89 OTHER DEPRESSION: ICD-10-CM

## 2020-02-28 RX ORDER — DULOXETIN HYDROCHLORIDE 60 MG/1
60 CAPSULE, DELAYED RELEASE ORAL DAILY
Qty: 90 CAPSULE | Refills: 1 | Status: SHIPPED | OUTPATIENT
Start: 2020-02-28 | End: 2020-10-19

## 2020-02-28 RX ORDER — PANTOPRAZOLE SODIUM 40 MG/1
40 TABLET, DELAYED RELEASE ORAL DAILY
Qty: 90 TABLET | Refills: 1 | Status: SHIPPED | OUTPATIENT
Start: 2020-02-28 | End: 2020-09-08

## 2020-02-28 NOTE — TELEPHONE ENCOUNTER
Pt called and wanted to see if she could get her nabumetone (RELAFEN) 500 MG tablet changed to something else, its not helping at all.    She also needs refills on pantoprazole (PROTONIX) 40 MG EC tablet    DULoxetine (CYMBALTA) 60 MG capsule    Pharmacy confirmed [facility] [address] [place] [address] - [phone] [facility] [phone]   [phone]

## 2020-03-03 ENCOUNTER — TELEPHONE (OUTPATIENT)
Dept: INTERNAL MEDICINE | Facility: CLINIC | Age: 55
End: 2020-03-03

## 2020-03-03 RX ORDER — CELECOXIB 200 MG/1
200 CAPSULE ORAL DAILY
Qty: 30 CAPSULE | Refills: 2 | Status: SHIPPED | OUTPATIENT
Start: 2020-03-03 | End: 2020-05-27 | Stop reason: SDUPTHER

## 2020-03-03 NOTE — TELEPHONE ENCOUNTER
PT CALLED ON 2/28 WITH THE FOLLOWING MESSAGE      Pt called and wanted to see if she could get her nabumetone (RELAFEN) 500 MG tablet changed to something else, its not helping at all.    PT IS REQUESTING SOMETHING DIFFERENT SHE IS NOT TAKING RELAFEN    PT CALL BACK [phone]    [facility] [address] -  CONFIRMED    Change to celebrex 200mg qd prn, 30, RF 2

## 2020-05-01 ENCOUNTER — LAB REQUISITION (OUTPATIENT)
Dept: LAB | Facility: HOSPITAL | Age: 55
End: 2020-05-01

## 2020-05-01 DIAGNOSIS — Z00.00 ROUTINE GENERAL MEDICAL EXAMINATION AT A HEALTH CARE FACILITY: ICD-10-CM

## 2020-05-01 PROCEDURE — 36415 COLL VENOUS BLD VENIPUNCTURE: CPT | Performed by: NURSE PRACTITIONER

## 2020-05-07 RX ORDER — TRIAMTERENE AND HYDROCHLOROTHIAZIDE 37.5; 25 MG/1; MG/1
TABLET ORAL
Qty: 90 TABLET | Refills: 0 | Status: SHIPPED | OUTPATIENT
Start: 2020-05-07 | End: 2020-08-17

## 2020-05-27 ENCOUNTER — TELEPHONE (OUTPATIENT)
Dept: INTERNAL MEDICINE | Facility: CLINIC | Age: 55
End: 2020-05-27

## 2020-05-27 RX ORDER — CELECOXIB 200 MG/1
CAPSULE ORAL
Qty: 90 CAPSULE | Refills: 3 | Status: SHIPPED | OUTPATIENT
Start: 2020-05-27 | End: 2021-06-08

## 2020-05-27 RX ORDER — CELECOXIB 200 MG/1
200 CAPSULE ORAL DAILY
Qty: 30 CAPSULE | Refills: 2 | Status: SHIPPED | OUTPATIENT
Start: 2020-05-27 | End: 2021-07-19

## 2020-05-27 NOTE — TELEPHONE ENCOUNTER
PATIENT IS REQUESTING A REFILL FOR     celecoxib (CELEBREX) 200 MG capsule    PATIENT CALL BACK [phone]     [facility] [place]

## 2020-07-31 ENCOUNTER — LAB REQUISITION (OUTPATIENT)
Dept: LAB | Facility: HOSPITAL | Age: 55
End: 2020-07-31

## 2020-07-31 DIAGNOSIS — N95.1 MENOPAUSAL AND FEMALE CLIMACTERIC STATES: ICD-10-CM

## 2020-07-31 PROCEDURE — 36415 COLL VENOUS BLD VENIPUNCTURE: CPT | Performed by: NURSE PRACTITIONER

## 2020-08-17 RX ORDER — TRIAMTERENE AND HYDROCHLOROTHIAZIDE 37.5; 25 MG/1; MG/1
TABLET ORAL
Qty: 90 TABLET | Refills: 0 | Status: SHIPPED | OUTPATIENT
Start: 2020-08-17 | End: 2020-11-12

## 2020-09-08 RX ORDER — PANTOPRAZOLE SODIUM 40 MG/1
TABLET, DELAYED RELEASE ORAL
Qty: 90 TABLET | Refills: 0 | Status: SHIPPED | OUTPATIENT
Start: 2020-09-08 | End: 2020-12-18

## 2020-10-19 DIAGNOSIS — F32.89 OTHER DEPRESSION: ICD-10-CM

## 2020-10-19 RX ORDER — DULOXETIN HYDROCHLORIDE 60 MG/1
CAPSULE, DELAYED RELEASE ORAL
Qty: 90 CAPSULE | Refills: 1 | Status: SHIPPED | OUTPATIENT
Start: 2020-10-19 | End: 2021-04-19

## 2020-10-30 ENCOUNTER — LAB REQUISITION (OUTPATIENT)
Dept: LAB | Facility: HOSPITAL | Age: 55
End: 2020-10-30

## 2020-10-30 DIAGNOSIS — Z00.00 ROUTINE GENERAL MEDICAL EXAMINATION AT A HEALTH CARE FACILITY: ICD-10-CM

## 2020-11-12 RX ORDER — ONDANSETRON 4 MG/1
4 TABLET, FILM COATED ORAL EVERY 8 HOURS PRN
Qty: 30 TABLET | Refills: 1 | Status: SHIPPED | OUTPATIENT
Start: 2020-11-12 | End: 2022-05-17

## 2020-11-12 RX ORDER — TRIAMTERENE AND HYDROCHLOROTHIAZIDE 37.5; 25 MG/1; MG/1
TABLET ORAL
Qty: 90 TABLET | Refills: 0 | Status: SHIPPED | OUTPATIENT
Start: 2020-11-12 | End: 2021-02-19

## 2020-11-12 NOTE — TELEPHONE ENCOUNTER
Caller: Vanna Black    Relationship: Self    Best call back number: [phone]    Medication needed:   PATIENT DOES NOT KNOW THE NAME OF THE MEDICATION. SHE STATED HER DR PRESCRIBES IT FOR HER WHEN SHE IS TRAVELLING, MAYBE FOR NAUSEA.    When do you need the refill by: ASAP TRAVELLING SOON        Does the patient have less than a 3 day supply:  [] Yes  [x] No    What is the patient's preferred pharmacy: [facility] [address] [place] [address] [phone] [facility] [phone]

## 2020-12-04 ENCOUNTER — TELEPHONE (OUTPATIENT)
Dept: INTERNAL MEDICINE | Facility: CLINIC | Age: 55
End: 2020-12-04

## 2020-12-04 RX ORDER — AZITHROMYCIN 250 MG/1
TABLET, FILM COATED ORAL
Qty: 6 TABLET | Refills: 0 | OUTPATIENT
Start: 2020-12-04 | End: 2021-03-01

## 2020-12-04 NOTE — TELEPHONE ENCOUNTER
Caller: Vanna Black    Relationship: Self    Best call back number: [phone]    What medication are you requesting: MEDICATION FOR SORE THROAT, COUGH    What are your current symptoms: SORE THROAT, COUGH    How long have you been experiencing symptoms: 3 DAYS    Have you had these symptoms before:    [] Yes  [x] No    Have you been treated for these symptoms before:   [] Yes  [x] No    If a prescription is needed, what is your preferred pharmacy and phone number: [address] [phone] [facility] [phone]      Additional notes:  PATIENT STATES SHE IS GOING TO HAVE A COVID TEST DONE 12/05.

## 2020-12-16 ENCOUNTER — TELEPHONE (OUTPATIENT)
Dept: INTERNAL MEDICINE | Facility: CLINIC | Age: 55
End: 2020-12-16

## 2020-12-16 RX ORDER — FLUCONAZOLE 150 MG/1
150 TABLET ORAL ONCE
Qty: 1 TABLET | Refills: 0 | Status: SHIPPED | OUTPATIENT
Start: 2020-12-16 | End: 2020-12-16

## 2020-12-16 NOTE — TELEPHONE ENCOUNTER
PATIENT STATED THAT SHE JUST GOT DONE TAKING LAST OF ZPAK THAT WAS PRESCRIBED FOR OTHER HEALTH ISSUE IN THE PAST WEEK    PATIENT IS EXPERIENCING BACK PAIN AND DISCHARGE SINCE  DEC 14, 2020.  PATIENT STATED THAT SHE USUALLY GETS A YEAST INFECTION WITH SOME ANTIBIOTICS     PATIENT WOULD LIKE TO HAVE DOCTOR  CALL HER IN SOMETHING FOR THIS MEDICAL CONCERN    PLEASE ADVISE    PATIENT CALL BACK [phone]

## 2020-12-18 RX ORDER — PANTOPRAZOLE SODIUM 40 MG/1
TABLET, DELAYED RELEASE ORAL
Qty: 90 TABLET | Refills: 3 | Status: SHIPPED | OUTPATIENT
Start: 2020-12-18 | End: 2021-12-09

## 2021-02-19 RX ORDER — TRIAMTERENE AND HYDROCHLOROTHIAZIDE 37.5; 25 MG/1; MG/1
TABLET ORAL
Qty: 90 TABLET | Refills: 3 | Status: SHIPPED | OUTPATIENT
Start: 2021-02-19 | End: 2022-02-21

## 2021-04-12 ENCOUNTER — TELEPHONE (OUTPATIENT)
Dept: INTERNAL MEDICINE | Facility: CLINIC | Age: 56
End: 2021-04-12

## 2021-04-12 NOTE — TELEPHONE ENCOUNTER
Caller: Vanna Black    Relationship: Self    Best call back number: [phone]    What is the medical concern/diagnosis: LEFT FOOT FRACTURE    What specialty or service is being requested: ORTHOPEDICS    What is the provider, practice or medical service name: [facility]    Any additional details: PATIENT STATED SHE WAS TREATED FOR A HAIRLINE FRACTURE OF HER LEFT FOOT AT THE [facility] ON 3/1/21. PATIENT STATED SHE WORE A BOOT UNTIL ABOUT A WEEK AGO. PATIENT STATED HER LEFT FOOT IS VERY SWOLLEN AND PAINFUL SINCE SHE STOPPED WEARING THE BOOT.

## 2021-04-19 DIAGNOSIS — F32.89 OTHER DEPRESSION: ICD-10-CM

## 2021-04-19 RX ORDER — DULOXETIN HYDROCHLORIDE 60 MG/1
CAPSULE, DELAYED RELEASE ORAL
Qty: 90 CAPSULE | Refills: 1 | Status: SHIPPED | OUTPATIENT
Start: 2021-04-19 | End: 2021-10-21

## 2021-06-08 RX ORDER — CELECOXIB 200 MG/1
200 CAPSULE ORAL DAILY PRN
Qty: 90 CAPSULE | Refills: 0 | Status: SHIPPED | OUTPATIENT
Start: 2021-06-08 | End: 2021-09-02

## 2021-07-19 ENCOUNTER — OFFICE VISIT (OUTPATIENT)
Dept: INTERNAL MEDICINE | Facility: CLINIC | Age: 56
End: 2021-07-19

## 2021-07-19 VITALS
OXYGEN SATURATION: 98 % | HEIGHT: 68 IN | HEART RATE: 74 BPM | TEMPERATURE: 96.8 F | SYSTOLIC BLOOD PRESSURE: 130 MMHG | DIASTOLIC BLOOD PRESSURE: 70 MMHG | BODY MASS INDEX: 27.38 KG/M2

## 2021-07-19 DIAGNOSIS — I10 ESSENTIAL HYPERTENSION: Primary | ICD-10-CM

## 2021-07-19 DIAGNOSIS — B02.9 HERPES ZOSTER WITHOUT COMPLICATION: ICD-10-CM

## 2021-07-19 DIAGNOSIS — M25.50 ARTHRALGIA, UNSPECIFIED JOINT: ICD-10-CM

## 2021-07-19 DIAGNOSIS — G47.00 INSOMNIA, UNSPECIFIED TYPE: ICD-10-CM

## 2021-07-19 DIAGNOSIS — F41.9 ANXIETY: ICD-10-CM

## 2021-07-19 DIAGNOSIS — F32.89 OTHER DEPRESSION: ICD-10-CM

## 2021-07-19 PROCEDURE — 99214 OFFICE O/P EST MOD 30 MIN: CPT | Performed by: INTERNAL MEDICINE

## 2021-07-19 RX ORDER — FAMCICLOVIR 500 MG/1
500 TABLET ORAL 3 TIMES DAILY
Qty: 21 TABLET | Refills: 0 | Status: SHIPPED | OUTPATIENT
Start: 2021-07-19 | End: 2021-07-26

## 2021-07-19 NOTE — PROGRESS NOTES
Patient is a 56 y.o. female who is here for numbness.  Chief Complaint   Patient presents with   • Numbness         HPI:    Here for evaluation of shingles on left UE.  Onset last week.  Took left over steroids.  Feels like pains and needles.  Left arm feels heavy.  No fever or chills.  Rash is improved.  No weakness in LUE.    Recently in [place].  No abdominal pains.     History:     Patient Active Problem List   Diagnosis   • Anxiety   • Depression   • Hypertension   • Insomnia   • Pain in joint   • H/O traveler's diarrhea   • Boil of scalp   • Acute pain of right knee   • Fatigue   • Herpes zoster without complication       Past Medical History:   Diagnosis Date   • Arthritis    • Cancer (CMS/HCC)     Skin   • Cervical dysplasia    • H/O colonoscopy    • Ovarian cancer (CMS/HCC)        Past Surgical History:   Procedure Laterality Date   • AUGMENTATION MAMMAPLASTY     • BREAST EXCISIONAL BIOPSY Left    • DIAGNOSTIC LAPAROSCOPY     • OOPHORECTOMY     • OTHER SURGICAL HISTORY      Facial surgery   • OTHER SURGICAL HISTORY      Leg repair   • TOTAL ABDOMINAL HYSTERECTOMY      removal of both ovaries       Current Outpatient Medications on File Prior to Visit   Medication Sig   • celecoxib (CeleBREX) 200 MG capsule Take 1 capsule by mouth Daily As Needed for Moderate Pain .   • DULoxetine (CYMBALTA) 60 MG capsule Take 1 capsule by mouth once daily   • hydrOXYzine (ATARAX) 25 MG tablet Take 1 tablet by mouth Every 8 (Eight) Hours As Needed for Anxiety. May cause sleepiness   • ondansetron (Zofran) 4 MG tablet Take 1 tablet by mouth Every 8 (Eight) Hours As Needed for Nausea or Vomiting.   • pantoprazole (PROTONIX) 40 MG EC tablet Take 1 tablet by mouth once daily   • triamterene-hydrochlorothiazide (MAXZIDE-25) 37.5-25 MG per tablet Take 1 tablet by mouth once daily   • zolpidem (AMBIEN) 10 MG tablet TAKE ONE TABLET BY MOUTH AT BEDTIME AS NEEDED   • [DISCONTINUED] busPIRone (BUSPAR) 10 MG tablet TAKE 1 TABLET BY  MOUTH TWICE DAILY   • [DISCONTINUED] celecoxib (CeleBREX) 200 MG capsule Take 1 capsule by mouth Daily.   • [DISCONTINUED] ESTRING 2 MG vaginal ring      No current facility-administered medications on file prior to visit.       Family History   Problem Relation Age of Onset   • Mental illness Mother         mental disorder   • Breast cancer Mother 54   • Hypertension Father    • Heart attack Father    • Breast cancer Maternal Aunt    • Breast cancer Maternal Aunt        Social History     Socioeconomic History   • Marital status:      Spouse name: Not on file   • Number of children: Not on file   • Years of education: Not on file   • Highest education level: Not on file   Tobacco Use   • Smoking status: Never Smoker   • Smokeless tobacco: Never Used   Substance and Sexual Activity   • Alcohol use: Yes   • Drug use: No   • Sexual activity: Defer         Review of Systems   Constitutional: Negative for chills, fatigue, fever and unexpected weight change.   HENT: Negative for congestion, ear pain, hearing loss, rhinorrhea, sinus pressure, sore throat and trouble swallowing.    Eyes: Negative for discharge and itching.   Respiratory: Negative for cough, chest tightness and shortness of breath.    Cardiovascular: Negative for chest pain, palpitations and leg swelling.   Gastrointestinal: Negative for abdominal pain, blood in stool, constipation, diarrhea and vomiting.        [place] [facility] [place]   Endocrine: Negative for polydipsia and polyuria.   Genitourinary: Negative for difficulty urinating, dysuria, enuresis, frequency, hematuria and urgency.   Musculoskeletal: Negative for arthralgias, back pain, gait problem and joint swelling.   Skin: Positive for rash. Negative for wound.   Allergic/Immunologic: Negative for immunocompromised state.   Neurological: Positive for numbness. Negative for dizziness, syncope, weakness, light-headedness and headaches.   Hematological: Does not bruise/bleed  "easily.   Psychiatric/Behavioral: Negative for behavioral problems, dysphoric mood and sleep disturbance. The patient is not nervous/anxious.        /70   Pulse 74   Temp 96.8 °F (36 °C) (Infrared)   Ht 172.7 cm (67.99\")   LMP  (LMP Unknown)   SpO2 98%   BMI 27.38 kg/m²       Physical Exam  Constitutional:       Appearance: Normal appearance. She is well-developed.   HENT:      Head: Normocephalic and atraumatic.      Right Ear: External ear normal.      Left Ear: External ear normal.      Nose: Nose normal.      Mouth/Throat:      Mouth: Mucous membranes are moist.      Pharynx: Oropharynx is clear.   Eyes:      Extraocular Movements: Extraocular movements intact.      Conjunctiva/sclera: Conjunctivae normal.      Pupils: Pupils are equal, round, and reactive to light.   Cardiovascular:      Rate and Rhythm: Normal rate and regular rhythm.      Heart sounds: Normal heart sounds.   Pulmonary:      Effort: Pulmonary effort is normal.      Breath sounds: Normal breath sounds.   Abdominal:      General: Bowel sounds are normal.      Palpations: Abdomen is soft.   Musculoskeletal:         General: Normal range of motion.      Cervical back: Normal range of motion and neck supple.   Lymphadenopathy:      Cervical: No cervical adenopathy.   Skin:     General: Skin is warm and dry.      Findings: Erythema (LUE with scabing) present.   Neurological:      General: No focal deficit present.      Mental Status: She is alert and oriented to person, place, and time.   Psychiatric:         Mood and Affect: Mood normal.         Behavior: Behavior normal.         Thought Content: Thought content normal.         Procedure:      Discussion/Summary:    Depression/anxiety-cymbalta continuation  joint pain/DJD-advised to limit narc  insomnia-cont prn ambien  htn/edema-stable on Maxzide  Situational anxiety-cont 1/2 of hydroxyzine prn  Zoster-rx Famvir      labs noted and dw patient     Current Outpatient Medications:   •  " celecoxib (CeleBREX) 200 MG capsule, Take 1 capsule by mouth Daily As Needed for Moderate Pain ., Disp: 90 capsule, Rfl: 0  •  DULoxetine (CYMBALTA) 60 MG capsule, Take 1 capsule by mouth once daily, Disp: 90 capsule, Rfl: 1  •  hydrOXYzine (ATARAX) 25 MG tablet, Take 1 tablet by mouth Every 8 (Eight) Hours As Needed for Anxiety. May cause sleepiness, Disp: 30 tablet, Rfl: 1  •  ondansetron (Zofran) 4 MG tablet, Take 1 tablet by mouth Every 8 (Eight) Hours As Needed for Nausea or Vomiting., Disp: 30 tablet, Rfl: 1  •  pantoprazole (PROTONIX) 40 MG EC tablet, Take 1 tablet by mouth once daily, Disp: 90 tablet, Rfl: 3  •  triamterene-hydrochlorothiazide (MAXZIDE-25) 37.5-25 MG per tablet, Take 1 tablet by mouth once daily, Disp: 90 tablet, Rfl: 3  •  zolpidem (AMBIEN) 10 MG tablet, TAKE ONE TABLET BY MOUTH AT BEDTIME AS NEEDED, Disp: 30 tablet, Rfl: 2  •  famciclovir (FAMVIR) 500 MG tablet, Take 1 tablet by mouth 3 (Three) Times a Day for 7 days., Disp: 21 tablet, Rfl: 0        Diagnoses and all orders for this visit:    1. Essential hypertension (Primary)    2. Other depression    3. Anxiety    4. Arthralgia, unspecified joint    5. Insomnia, unspecified type    6. Herpes zoster without complication  -     famciclovir (FAMVIR) 500 MG tablet; Take 1 tablet by mouth 3 (Three) Times a Day for 7 days.  Dispense: 21 tablet; Refill: 0

## 2021-09-02 RX ORDER — CELECOXIB 200 MG/1
CAPSULE ORAL
Qty: 90 CAPSULE | Refills: 0 | Status: SHIPPED | OUTPATIENT
Start: 2021-09-02 | End: 2021-12-09

## 2021-10-21 DIAGNOSIS — F32.89 OTHER DEPRESSION: ICD-10-CM

## 2021-10-21 RX ORDER — DULOXETIN HYDROCHLORIDE 60 MG/1
CAPSULE, DELAYED RELEASE ORAL
Qty: 90 CAPSULE | Refills: 3 | Status: SHIPPED | OUTPATIENT
Start: 2021-10-21 | End: 2022-01-13

## 2021-10-26 ENCOUNTER — LAB (OUTPATIENT)
Dept: LAB | Facility: HOSPITAL | Age: 56
End: 2021-10-26

## 2021-10-26 DIAGNOSIS — Z79.890 NEED FOR PROPHYLACTIC HORMONE REPLACEMENT THERAPY (POSTMENOPAUSAL): ICD-10-CM

## 2021-10-26 DIAGNOSIS — N95.1 SYMPTOMATIC MENOPAUSAL OR FEMALE CLIMACTERIC STATES: Primary | ICD-10-CM

## 2021-10-26 PROCEDURE — 84403 ASSAY OF TOTAL TESTOSTERONE: CPT

## 2021-10-26 PROCEDURE — 82670 ASSAY OF TOTAL ESTRADIOL: CPT

## 2021-10-26 PROCEDURE — 83001 ASSAY OF GONADOTROPIN (FSH): CPT

## 2021-10-26 PROCEDURE — 84402 ASSAY OF FREE TESTOSTERONE: CPT

## 2021-10-26 PROCEDURE — 36415 COLL VENOUS BLD VENIPUNCTURE: CPT

## 2021-10-27 LAB
ESTRADIOL SERPL HS-MCNC: 66.7 PG/ML
FSH SERPL-ACNC: 2.2 MIU/ML

## 2021-10-29 LAB
TESTOST FREE SERPL-MCNC: 0.7 PG/ML (ref 0–4.2)
TESTOST SERPL-MCNC: 6 NG/DL (ref 4–50)

## 2021-11-06 RX ORDER — FAMCICLOVIR 500 MG/1
500 TABLET ORAL 3 TIMES DAILY
Qty: 21 TABLET | Refills: 0 | Status: SHIPPED | OUTPATIENT
Start: 2021-11-06 | End: 2021-11-13

## 2021-12-09 RX ORDER — PANTOPRAZOLE SODIUM 40 MG/1
TABLET, DELAYED RELEASE ORAL
Qty: 90 TABLET | Refills: 0 | Status: SHIPPED | OUTPATIENT
Start: 2021-12-09 | End: 2022-03-09

## 2021-12-09 RX ORDER — CELECOXIB 200 MG/1
CAPSULE ORAL
Qty: 90 CAPSULE | Refills: 0 | Status: SHIPPED | OUTPATIENT
Start: 2021-12-09 | End: 2022-04-26

## 2022-01-13 ENCOUNTER — TELEPHONE (OUTPATIENT)
Dept: INTERNAL MEDICINE | Facility: CLINIC | Age: 57
End: 2022-01-13

## 2022-01-13 RX ORDER — BUPROPION HYDROCHLORIDE 150 MG/1
150 TABLET ORAL DAILY
Qty: 90 TABLET | Refills: 2 | Status: SHIPPED | OUTPATIENT
Start: 2022-01-13 | End: 2022-10-04

## 2022-01-13 NOTE — TELEPHONE ENCOUNTER
She has had a broken foot since may.  She is having a hard time and very emotional.  She wants to go off cymbalta and try something else.  She has gained a bunch of weight.  She wants to go off cymbalta for sure

## 2022-01-13 NOTE — TELEPHONE ENCOUNTER
Caller: Vanna Black    Relationship: Self    Best call back number:     [phone]     What medications are you currently taking:      N/A    When did you start taking these medications:     N/A    Which medication are you concerned about:     DULoxetine (CYMBALTA) 60 MG capsule    Who prescribed you this medication:     DR CORTEZ    What are your concerns:     PATIENT HAS BEEN UNDER STRESS AND DOESN'T FEEL LIKE THE MEDICATION HAS BEEN HELPING    PATIENT REQUESTED A CALL BACK WITH CONFIRMATION IF MEDICATION SHOULD BE CHANGED IN ANY WAY    How long have you had these concerns:     N/A    DR CORTEZ

## 2022-01-21 ENCOUNTER — OFFICE VISIT (OUTPATIENT)
Dept: ORTHOPEDIC SURGERY | Facility: CLINIC | Age: 57
End: 2022-01-21

## 2022-01-21 VITALS
DIASTOLIC BLOOD PRESSURE: 70 MMHG | BODY MASS INDEX: 30.16 KG/M2 | WEIGHT: 199 LBS | HEIGHT: 68 IN | SYSTOLIC BLOOD PRESSURE: 139 MMHG

## 2022-01-21 DIAGNOSIS — M25.561 RIGHT KNEE PAIN, UNSPECIFIED CHRONICITY: Primary | ICD-10-CM

## 2022-01-21 DIAGNOSIS — M17.11 PRIMARY OSTEOARTHRITIS OF RIGHT KNEE: ICD-10-CM

## 2022-01-21 PROCEDURE — 20610 DRAIN/INJ JOINT/BURSA W/O US: CPT | Performed by: ORTHOPAEDIC SURGERY

## 2022-01-21 PROCEDURE — 99213 OFFICE O/P EST LOW 20 MIN: CPT | Performed by: ORTHOPAEDIC SURGERY

## 2022-01-21 RX ORDER — LIDOCAINE HYDROCHLORIDE 10 MG/ML
4 INJECTION, SOLUTION EPIDURAL; INFILTRATION; INTRACAUDAL; PERINEURAL
Status: COMPLETED | OUTPATIENT
Start: 2022-01-21 | End: 2022-01-21

## 2022-01-21 RX ORDER — TRIAMCINOLONE ACETONIDE 40 MG/ML
40 INJECTION, SUSPENSION INTRA-ARTICULAR; INTRAMUSCULAR
Status: COMPLETED | OUTPATIENT
Start: 2022-01-21 | End: 2022-01-21

## 2022-01-21 RX ORDER — IBUPROFEN 800 MG/1
800 TABLET ORAL AS NEEDED
COMMUNITY
End: 2022-05-23

## 2022-01-21 RX ORDER — BUPIVACAINE HYDROCHLORIDE 2.5 MG/ML
4 INJECTION, SOLUTION EPIDURAL; INFILTRATION; INTRACAUDAL
Status: COMPLETED | OUTPATIENT
Start: 2022-01-21 | End: 2022-01-21

## 2022-01-21 RX ADMIN — TRIAMCINOLONE ACETONIDE 40 MG: 40 INJECTION, SUSPENSION INTRA-ARTICULAR; INTRAMUSCULAR at 08:13

## 2022-01-21 RX ADMIN — LIDOCAINE HYDROCHLORIDE 4 ML: 10 INJECTION, SOLUTION EPIDURAL; INFILTRATION; INTRACAUDAL; PERINEURAL at 08:13

## 2022-01-21 RX ADMIN — BUPIVACAINE HYDROCHLORIDE 4 ML: 2.5 INJECTION, SOLUTION EPIDURAL; INFILTRATION; INTRACAUDAL at 08:13

## 2022-01-21 NOTE — PROGRESS NOTES
Procedure   Large Joint Arthrocentesis: R knee  Date/Time: 1/21/2022 8:13 AM  Consent given by: patient  Site marked: site marked  Timeout: Immediately prior to procedure a time out was called to verify the correct patient, procedure, equipment, support staff and site/side marked as required   Supporting Documentation  Indications: pain   Procedure Details  Location: knee - R knee  Preparation: Patient was prepped and draped in the usual sterile fashion  Needle size: 22 G  Approach: anterolateral  Medications administered: 4 mL bupivacaine (PF) 0.25 %; 4 mL lidocaine PF 1% 1 %; 40 mg triamcinolone acetonide 40 MG/ML  Patient tolerance: patient tolerated the procedure well with no immediate complications

## 2022-01-21 NOTE — PROGRESS NOTES
[facility] [place]    Subjective     CC: Follow-up (2 year recheck - Right Kneee Arthroscopy and Partial Medial & Lateral Meniscectomies 09/24/2019)      HPI  Vanna Black is a 56 y.o. female who presents with new problem of: right knee pain.  Onset: possibly exercise. The issue has been ongoing for 4 month(s). Pain is a 9/10 on the pain scale. Pain is described as stabbing. Associated symptoms include pain and swelling. The pain is worse with any movement of the joint; nothing improve the pain. Previous treatments have included: NSAIDS.    I have reviewed the following portions of the patient's history:History of Present Illness and review of systems.      She has had a left foot injury and may need surgery on that.  This is caused her to put more pressure on his right knee.  She says it feels like a meniscus tear like when she had in 2019.      Review of Systems   Constitutional: Negative.  Negative for chills, fatigue and fever.   HENT: Negative.  Negative for congestion and dental problem.    Eyes: Negative.  Negative for blurred vision.   Respiratory: Negative.  Negative for shortness of breath.    Cardiovascular: Negative.  Negative for leg swelling.   Gastrointestinal: Negative.  Negative for abdominal pain.   Endocrine: Negative.  Negative for polyuria.   Genitourinary: Negative.  Negative for difficulty urinating.   Musculoskeletal: Positive for arthralgias.   Skin: Negative.    Allergic/Immunologic: Negative.    Neurological: Negative.    Hematological: Negative.  Negative for adenopathy.   Psychiatric/Behavioral: Negative.  Negative for behavioral problems.       ROS:    Constiutional:Pt denies fever, chills, nausea, or vomiting.  MSK:as above      Objective      Past Medical History  Past Medical History:   Diagnosis Date   • Arthritis    • Cancer (HCC)     Skin   • Cervical dysplasia    • H/O colonoscopy    • Ovarian cancer (HCC)          Physical Exam  /70   Ht 172.7  "cm (67.99\")   Wt 90.3 kg (199 lb)   LMP  (LMP Unknown)   BMI 30.26 kg/m²     Body mass index is 30.26 kg/m².    Patient is well nourished and well developed.        Ortho Exam  Tender lateral joint line.  Positive [place].  Trace effusion.  Stable ligaments.    Imaging/Labs/EMG Reviewed:  Imaging Results (Last 24 Hours)     Procedure Component Value Units Date/Time    XR Knee 4+ View Right [[id number]] Resulted: 01/21/22 0810     Updated: 01/21/22 0811    Narrative:      Knee X-Ray  Indication: Pain    Upright AP of bilateral knees. Lateral, skiers and Sunrise views of right   knee     Findings: Bone-on-bone medial compartment with osteophytes  Tibial nail in place  No fracture   prior studies were available for comparison.            Assessment:  1. Right knee pain, unspecified chronicity    2. Primary osteoarthritis of right knee        Plan:  1. Recommend over the counter anti-inflammatories for pain and/or swelling  2. I injected her right knee with cortisone.  If that does not help then she will get an MRI    Follow Up:   Return if symptoms worsen or fail to improve.      Medical Decision Making  Management Options : Low - OTC Drugs        Joe Burrell M.D., [facility]  Orthopedic Surgeon  Fellowship Trained Sports Medicine  [facility]  Orthopedics and Sports Medicine  [address], [address]  [place] [address]    EMR Dragon/Transcription disclaimer:  Much of this encounter note is an electronic transcription of spoken language to printed text. Electronic transcription of spoken language may permit erroneous, or at times, nonsensical words or phrases to be inadvertently transcribed. Although I have reviewed the note for such errors, some may still exist.  "

## 2022-01-24 ENCOUNTER — TELEPHONE (OUTPATIENT)
Dept: ORTHOPEDIC SURGERY | Facility: CLINIC | Age: 57
End: 2022-01-24

## 2022-01-24 DIAGNOSIS — M25.561 RIGHT KNEE PAIN, UNSPECIFIED CHRONICITY: Primary | ICD-10-CM

## 2022-01-24 NOTE — TELEPHONE ENCOUNTER
Patient says her and  had a conversation about an MRI after injections if they did not work for her. She called to let him know it did not work and she needs an order for the MRI she will be going to an outside facility to obtain. Would like call when order is ready.

## 2022-01-27 ENCOUNTER — TELEPHONE (OUTPATIENT)
Dept: ORTHOPEDIC SURGERY | Facility: CLINIC | Age: 57
End: 2022-01-27

## 2022-01-27 NOTE — TELEPHONE ENCOUNTER
LVM for patient to return call.  If she calls back, advise her she will need an appointment to go over MRI and the results.

## 2022-01-27 NOTE — TELEPHONE ENCOUNTER
PT CALLED SHE STATED SHE HAD HER MRI YESTERDAY SHE HAS THE DISC. SHE WOULD LIKE FOR SOMEONE TO CALL HER.

## 2022-01-31 ENCOUNTER — TELEPHONE (OUTPATIENT)
Dept: ORTHOPEDIC SURGERY | Facility: CLINIC | Age: 57
End: 2022-01-31

## 2022-01-31 NOTE — TELEPHONE ENCOUNTER
Pt called in to state that she has COVID. Called and spoke with her insurance company to see if the  would cover a TELEPHONE VISIT and insurance APPROVED      Wanted to know if it would be okay with Dr Burrell to discuss MRI by phone. Appt is 2-2-22

## 2022-02-01 ENCOUNTER — TELEPHONE (OUTPATIENT)
Dept: INTERNAL MEDICINE | Facility: CLINIC | Age: 57
End: 2022-02-01

## 2022-02-01 NOTE — TELEPHONE ENCOUNTER
Caller: Vanna Black    Relationship to patient: Self    Best call back number: [phone]     Date of positive COVID19 test: 1-29-22    COVID19 symptoms: COUGH, BODY ACHE, RUNNY NOSE, FATIGUE    Additional information or concerns: PATIENT WOULD LIKE TO KNOW IF THERE IS ANYTHING SHE NEEDS TO DO.    What is the patients preferred pharmacy:

## 2022-02-02 ENCOUNTER — OFFICE VISIT (OUTPATIENT)
Dept: ORTHOPEDIC SURGERY | Facility: CLINIC | Age: 57
End: 2022-02-02

## 2022-02-02 DIAGNOSIS — S83.231D COMPLEX TEAR OF MEDIAL MENISCUS OF RIGHT KNEE AS CURRENT INJURY, SUBSEQUENT ENCOUNTER: ICD-10-CM

## 2022-02-02 DIAGNOSIS — M25.561 RIGHT KNEE PAIN, UNSPECIFIED CHRONICITY: Primary | ICD-10-CM

## 2022-02-02 PROCEDURE — 99441 PR PHYS/QHP TELEPHONE EVALUATION 5-10 MIN: CPT | Performed by: ORTHOPAEDIC SURGERY

## 2022-02-02 NOTE — PROGRESS NOTES
[facility] [place]    Subjective     CC: Follow-up of the Right Knee (After MRI 01/26/2022)    You have chosen to receive care through a telephone visit. Do you consent to use a telephone visit for your medical care today? Yes    HPI    Vanna Black is a 56 y.o. female.  She was recently diagnosed with Covid.  She feels better now but is in quarantine.  Knee hurts the same.  Feels like she has a medial meniscal tear left which she had in the past.  She had a knee scope September 2019.    Review of Systems   Constitutional: Negative.  Negative for chills, fatigue and fever.   HENT: Negative.  Negative for congestion and dental problem.    Eyes: Negative.  Negative for blurred vision.   Respiratory: Negative.  Negative for shortness of breath.    Cardiovascular: Negative.  Negative for leg swelling.   Gastrointestinal: Negative.  Negative for abdominal pain.   Endocrine: Negative.  Negative for polyuria.   Genitourinary: Negative.  Negative for difficulty urinating.   Musculoskeletal: Positive for arthralgias.   Skin: Negative.    Allergic/Immunologic: Negative.    Neurological: Negative.    Hematological: Negative.  Negative for adenopathy.   Psychiatric/Behavioral: Negative.  Negative for behavioral problems.       ROS:    Constiutional:Pt denies fever, chills, nausea, or vomiting.  MSK:as above      Objective      Past Medical History  Past Medical History:   Diagnosis Date   • Arthritis    • Cancer (HCC)     Skin   • Cervical dysplasia    • H/O colonoscopy    • Ovarian cancer (HCC)          Physical Exam  LMP  (LMP Unknown)     There is no height or weight on file to calculate BMI.    Patient is well nourished and well developed.        Ortho Exam  No change in knee exam    Imaging/Labs/EMG Reviewed:  Imaging Results (Last 24 Hours)     ** No results found for the last 24 hours. **      Viewed and personally interpreted her MRI from last week from [facility] which shows medial meniscus  tear and medial compartment arthritis with a tibial nail    Assessment:  1. Right knee pain, unspecified chronicity    2. Complex tear of medial meniscus of right knee as current injury, subsequent encounter        Plan:  1. She would like to proceed with right knee arthroscopy with partial medial meniscectomy.  She try the cortisone injection and anti-inflammatories.  She understands because the arthritis the meniscus surgery may not meniscectomy perfect but she would like to try.  Her next option would be knee replacement.Treatment options and alternatives were discussed with patient.  Surgical risks include but are not limited to pain, bleeding, infection, failure to relieve symptoms, need for further procedures, recurrence of symptoms, damage to healthy adjacent structures, hardware loosening/failure, stiffness, weakness, scar, blood clots/DVT/PE, loss of limb or life. We also discussed the postoperative protocol and expected outcome although no guarantees are possible with surgery. All questions were answered; the patient would like to proceed with surgical intervention.    Follow Up:   No follow-ups on file.      Medical Decision Making  Management Options : Moderate - Decision regarding minor surgery with identified patient  or procedure risk factors    This visit has been rescheduled as a phone visit to comply with patient safety concerns in accordance with CDC recommendations. Total time of discussion was 8 minutes.      Jeo Burrell M.D., [facility]  Orthopedic Surgeon  Fellowship Trained Sports Medicine  [facility]  Orthopedics and Sports Medicine  [address], [address]  [place] [address]    EMR Dragon/Transcription disclaimer:  Much of this encounter note is an electronic transcription of spoken language to printed text. Electronic transcription of spoken language may permit erroneous, or at times, nonsensical words or phrases to be inadvertently transcribed. Although I  have reviewed the note for such errors, some may still exist.

## 2022-02-04 ENCOUNTER — TELEPHONE (OUTPATIENT)
Dept: INTERNAL MEDICINE | Facility: CLINIC | Age: 57
End: 2022-02-04

## 2022-02-04 NOTE — TELEPHONE ENCOUNTER
Caller: Vanna Black    Relationship: Self    Best call back number:    [phone]    What is the best time to reach you:     ANY TIME    Who are you requesting to speak with (clinical staff, provider,  specific staff member):     CRYSTAL    Do you know the name of the person who called:     N/A    What was the call regarding:    PATIENT DECLINED TO GIVE A REASON    Do you require a callback:     YES

## 2022-02-04 NOTE — TELEPHONE ENCOUNTER
Pt states that she is traveling to [place] on Feb 10th and needs a letter for travel. She tested positive on 1-31-22. She is uploading her covid positive test to her mychart.

## 2022-02-21 ENCOUNTER — HOSPITAL ENCOUNTER (OUTPATIENT)
Dept: MAMMOGRAPHY | Facility: HOSPITAL | Age: 57
Discharge: HOME | End: 2022-02-21
Admitting: NURSE PRACTITIONER

## 2022-02-21 ENCOUNTER — TRANSCRIBE ORDERS (OUTPATIENT)
Dept: ADMINISTRATIVE | Facility: HOSPITAL | Age: 57
End: 2022-02-21

## 2022-02-21 DIAGNOSIS — Z12.31 VISIT FOR SCREENING MAMMOGRAM: Primary | ICD-10-CM

## 2022-02-21 DIAGNOSIS — Z12.31 VISIT FOR SCREENING MAMMOGRAM: ICD-10-CM

## 2022-02-21 PROCEDURE — 77063 BREAST TOMOSYNTHESIS BI: CPT

## 2022-02-21 PROCEDURE — 77067 SCR MAMMO BI INCL CAD: CPT

## 2022-02-21 PROCEDURE — 77063 BREAST TOMOSYNTHESIS BI: CPT | Performed by: RADIOLOGY

## 2022-02-21 PROCEDURE — 77067 SCR MAMMO BI INCL CAD: CPT | Performed by: RADIOLOGY

## 2022-02-21 RX ORDER — TRIAMTERENE AND HYDROCHLOROTHIAZIDE 37.5; 25 MG/1; MG/1
TABLET ORAL
Qty: 90 TABLET | Refills: 1 | Status: SHIPPED | OUTPATIENT
Start: 2022-02-21 | End: 2022-08-10

## 2022-03-09 RX ORDER — PANTOPRAZOLE SODIUM 40 MG/1
TABLET, DELAYED RELEASE ORAL
Qty: 90 TABLET | Refills: 1 | Status: SHIPPED | OUTPATIENT
Start: 2022-03-09 | End: 2022-08-31

## 2022-04-04 DIAGNOSIS — S83.231D COMPLEX TEAR OF MEDIAL MENISCUS OF RIGHT KNEE AS CURRENT INJURY, SUBSEQUENT ENCOUNTER: Primary | ICD-10-CM

## 2022-04-06 DIAGNOSIS — S83.231D COMPLEX TEAR OF MEDIAL MENISCUS OF RIGHT KNEE AS CURRENT INJURY, SUBSEQUENT ENCOUNTER: Primary | ICD-10-CM

## 2022-04-12 ENCOUNTER — TELEPHONE (OUTPATIENT)
Dept: ORTHOPEDIC SURGERY | Facility: CLINIC | Age: 57
End: 2022-04-12

## 2022-04-12 NOTE — TELEPHONE ENCOUNTER
Patient called would like to speak to the nurse, regarding getting surgery scheduled. She stated she was told she has to do Therapy first and wants to know what can be done. Please call [phone]

## 2022-04-15 ENCOUNTER — OFFICE VISIT (OUTPATIENT)
Dept: ORTHOPEDIC SURGERY | Facility: CLINIC | Age: 57
End: 2022-04-15

## 2022-04-15 VITALS
HEIGHT: 68 IN | DIASTOLIC BLOOD PRESSURE: 84 MMHG | SYSTOLIC BLOOD PRESSURE: 122 MMHG | WEIGHT: 206 LBS | BODY MASS INDEX: 31.22 KG/M2

## 2022-04-15 DIAGNOSIS — S83.231D COMPLEX TEAR OF MEDIAL MENISCUS OF RIGHT KNEE AS CURRENT INJURY, SUBSEQUENT ENCOUNTER: Primary | ICD-10-CM

## 2022-04-15 PROCEDURE — 99214 OFFICE O/P EST MOD 30 MIN: CPT | Performed by: ORTHOPAEDIC SURGERY

## 2022-04-15 RX ORDER — METHOCARBAMOL 750 MG/1
TABLET, FILM COATED ORAL
COMMUNITY
Start: 2022-02-28 | End: 2022-05-23

## 2022-04-15 RX ORDER — ACETAMINOPHEN 500 MG
1000 TABLET ORAL EVERY 6 HOURS PRN
COMMUNITY
Start: 2022-02-28

## 2022-04-15 RX ORDER — GABAPENTIN 100 MG/1
100 CAPSULE ORAL 3 TIMES DAILY
COMMUNITY
Start: 2022-03-21 | End: 2022-05-23

## 2022-04-15 RX ORDER — IBUPROFEN 400 MG/1
TABLET ORAL
COMMUNITY
Start: 2022-02-28 | End: 2022-05-23

## 2022-04-15 NOTE — PROGRESS NOTES
[facility] [place] [facility] [place]    Subjective     CC: Follow-up (Right knee pain; cortisone injection 01.21.2022)      HPI     Vanna Black is a 57 y.o. female.  She is follow-up her right knee.  Her right knee pain started in November 2021.  Her knee pain started after she fractured her left foot.  She was putting all her weight on her knee and she had meniscus symptoms similar to what she had in 2019.  Her symptoms are locking catching and giving way.  She did well after her knee scope September 2019.  When her right knee started to hurt she spoke with her physical therapist Dennis contreras who is treating her for her left foot fracture.  He gave her exercises to do and she has been nonoperatively treated for her right knee medial meniscus tear since November.  She had COVID in January.  She had a left foot fracture surgery February 28 and was just cleared for weightbearing on the left foot on Tuesday.  She is now ready to proceed with the right knee arthroscopy for her medial meniscus tear.  She is also been treated with a cortisone shot and knee brace in addition to the physical therapy.  She has tried NSAIDs ice and heat.    Review of Systems   Constitutional: Positive for unexpected weight loss. Negative for chills, fatigue and fever.   HENT: Negative.  Negative for congestion and dental problem.    Eyes: Negative.  Negative for blurred vision.   Respiratory: Negative.  Negative for shortness of breath.    Cardiovascular: Negative.  Negative for leg swelling.   Gastrointestinal: Negative.  Negative for abdominal pain.   Endocrine: Negative.  Negative for polyuria.   Genitourinary: Negative.  Negative for difficulty urinating.   Musculoskeletal: Positive for arthralgias.   Skin: Negative.    Allergic/Immunologic: Negative.    Neurological: Negative.    Hematological: Negative.  Negative for adenopathy.   Psychiatric/Behavioral: Negative.  Negative for behavioral problems.       ROS:   "  Constiutional:Pt denies fever, chills, nausea, or vomiting.  MSK:as above      Objective      Past Medical History  Past Medical History:   Diagnosis Date   • Arthritis    • Cancer (HCC)     Skin   • Cervical dysplasia    • H/O colonoscopy    • Ovarian cancer (HCC)          Physical Exam  /84   Ht 172.7 cm (67.99\")   Wt 93.4 kg (206 lb)   LMP  (LMP Unknown)   BMI 31.33 kg/m²     Body mass index is 31.33 kg/m².    Patient is well nourished and well developed.        Ortho Exam  Right knee is tender medial joint line with a positive [place].  Stable ligaments.  Trace effusion.  She walks antalgic gait.    Imaging/Labs/EMG Reviewed:  Imaging Results (Last 24 Hours)     ** No results found for the last 24 hours. **      Her previous MRI shows a medial meniscus tear    Assessment:  1. Complex tear of medial meniscus of right knee as current injury, subsequent encounter        Plan:  1. I recommend right knee arthroscopy with partial medial meniscectomy.  She has failed 6 months of conservative management including physical therapy with Dennis contreras.  A brace, cortisone injection, and NSAIDs.  Treatment options and alternatives were discussed with patient.  Surgical risks include but are not limited to pain, bleeding, infection, failure to relieve symptoms, need for further procedures, recurrence of symptoms, damage to healthy adjacent structures, hardware loosening/failure, stiffness, weakness, scar, blood clots/DVT/PE, loss of limb or life. We also discussed the postoperative protocol and expected outcome although no guarantees are possible with surgery. All questions were answered; the patient would like to proceed with surgical intervention.    Follow Up:   Return for 1 week after surgery.      Medical Decision Making  Management Options : Moderate - Decision regarding minor surgery with identified patient  or procedure risk factors        Joe Burrell M.D., [facility]  Orthopedic Surgeon  Fellowship " Trained Sports Medicine  [facility]  Orthopedics and Sports Medicine  [address], [address]  [place] [address]    EMR Dragon/Transcription disclaimer:  Much of this encounter note is an electronic transcription of spoken language to printed text. Electronic transcription of spoken language may permit erroneous, or at times, nonsensical words or phrases to be inadvertently transcribed. Although I have reviewed the note for such errors, some may still exist.

## 2022-04-26 RX ORDER — CELECOXIB 200 MG/1
CAPSULE ORAL
Qty: 90 CAPSULE | Refills: 0 | Status: SHIPPED | OUTPATIENT
Start: 2022-04-26 | End: 2022-07-25

## 2022-05-01 ENCOUNTER — LAB (OUTPATIENT)
Dept: PREADMISSION TESTING | Facility: HOSPITAL | Age: 57
End: 2022-05-01

## 2022-05-01 DIAGNOSIS — S83.231D COMPLEX TEAR OF MEDIAL MENISCUS OF RIGHT KNEE AS CURRENT INJURY, SUBSEQUENT ENCOUNTER: ICD-10-CM

## 2022-05-01 LAB — SARS-COV-2 RNA PNL SPEC NAA+PROBE: NOT DETECTED

## 2022-05-01 PROCEDURE — U0004 COV-19 TEST NON-CDC HGH THRU: HCPCS

## 2022-05-01 PROCEDURE — C9803 HOPD COVID-19 SPEC COLLECT: HCPCS

## 2022-05-03 ENCOUNTER — TELEPHONE (OUTPATIENT)
Dept: ORTHOPEDIC SURGERY | Facility: CLINIC | Age: 57
End: 2022-05-03

## 2022-05-03 NOTE — TELEPHONE ENCOUNTER
Called patient in regards of message to cancel 5/11 appointment. Patient stated she had to cancel due to insurance to approving the surgery. Cancel her appointment

## 2022-05-06 RX ORDER — METHYLPREDNISOLONE 4 MG/1
1 TABLET ORAL ONCE
Qty: 21 TABLET | Refills: 0 | Status: SHIPPED | OUTPATIENT
Start: 2022-05-06 | End: 2022-05-06

## 2022-05-11 DIAGNOSIS — S83.231D COMPLEX TEAR OF MEDIAL MENISCUS OF RIGHT KNEE AS CURRENT INJURY, SUBSEQUENT ENCOUNTER: Primary | ICD-10-CM

## 2022-05-15 ENCOUNTER — LAB (OUTPATIENT)
Dept: PREADMISSION TESTING | Facility: HOSPITAL | Age: 57
End: 2022-05-15

## 2022-05-15 DIAGNOSIS — S83.231D COMPLEX TEAR OF MEDIAL MENISCUS OF RIGHT KNEE AS CURRENT INJURY, SUBSEQUENT ENCOUNTER: ICD-10-CM

## 2022-05-15 LAB — SARS-COV-2 RNA PNL SPEC NAA+PROBE: NOT DETECTED

## 2022-05-15 PROCEDURE — C9803 HOPD COVID-19 SPEC COLLECT: HCPCS

## 2022-05-15 PROCEDURE — U0004 COV-19 TEST NON-CDC HGH THRU: HCPCS

## 2022-05-23 ENCOUNTER — OFFICE VISIT (OUTPATIENT)
Dept: ORTHOPEDIC SURGERY | Facility: CLINIC | Age: 57
End: 2022-05-23

## 2022-05-23 VITALS — TEMPERATURE: 96.9 F

## 2022-05-23 DIAGNOSIS — Z98.890 S/P RIGHT KNEE ARTHROSCOPY: Primary | ICD-10-CM

## 2022-05-23 PROCEDURE — 99024 POSTOP FOLLOW-UP VISIT: CPT | Performed by: ORTHOPAEDIC SURGERY

## 2022-05-23 NOTE — PROGRESS NOTES
Chief Complaint   Patient presents with   • Post-op     Right knee arthroscopy 5/17/22           HPI  She is doing well.  She has difficulty with PT.  No new complaints.      Vitals:    05/23/22 0812   Temp: 96.9 °F (36.1 °C)         Physical Exam:  Right knee with minimal swelling.  Portals look good.        ICD-10-CM ICD-9-CM   1. S/P right knee arthroscopy  Z98.890 V45.89       Orders Placed This Encounter   Procedures   • Ambulatory Referral to Physical Therapy     She will continue physical therapy.  She asked about spine.  I told her to wait 21 days from surgery did not submerge in water.  Follow-up in 3 weeks.        Joe Burrell M.D., [facility]  Orthopedic Surgeon  Fellowship Trained Sports Medicine  [facility]  Orthopedics and Sports Medicine  [address] [place] [address], [address]  [place] [address]      EMR Dragon/Transcription disclaimer:  Much of this encounter note is an electronic transcription of spoken language to printed text. Electronic transcription of spoken language may permit erroneous, or at times, nonsensical words or phrases to be inadvertently transcribed. Although I have reviewed the note for such errors, some may still exist.

## 2022-07-18 NOTE — TELEPHONE ENCOUNTER
Goal Outcome Evaluation:    Pt evaluated, care ongoing.                       Pt needs some cipro.    What for??

## 2022-07-25 RX ORDER — CELECOXIB 200 MG/1
CAPSULE ORAL
Qty: 90 CAPSULE | Refills: 0 | Status: SHIPPED | OUTPATIENT
Start: 2022-07-25 | End: 2022-10-18

## 2022-07-25 NOTE — TELEPHONE ENCOUNTER
Rx Refill Note  Requested Prescriptions     Pending Prescriptions Disp Refills   • celecoxib (CeleBREX) 200 MG capsule [Pharmacy Med Name: Celecoxib 200 MG Oral Capsule] 90 capsule 0     Sig: TAKE 1 CAPSULE BY MOUTH ONCE DAILY AS NEEDED FOR MODERATE PAIN      Last office visit with prescribing clinician: 7/19/2021      Next office visit with prescribing clinician: Visit date not found            Carly Pelaez MA  07/25/22, 10:13 EDT

## 2022-08-10 RX ORDER — TRIAMTERENE AND HYDROCHLOROTHIAZIDE 37.5; 25 MG/1; MG/1
TABLET ORAL
Qty: 90 TABLET | Refills: 0 | Status: SHIPPED | OUTPATIENT
Start: 2022-08-10 | End: 2022-11-15

## 2022-08-31 ENCOUNTER — OFFICE VISIT (OUTPATIENT)
Dept: INTERNAL MEDICINE | Facility: CLINIC | Age: 57
End: 2022-08-31

## 2022-08-31 ENCOUNTER — LAB (OUTPATIENT)
Dept: LAB | Facility: HOSPITAL | Age: 57
End: 2022-08-31

## 2022-08-31 VITALS
SYSTOLIC BLOOD PRESSURE: 130 MMHG | HEART RATE: 83 BPM | BODY MASS INDEX: 31.33 KG/M2 | TEMPERATURE: 96.9 F | DIASTOLIC BLOOD PRESSURE: 78 MMHG | HEIGHT: 68 IN | OXYGEN SATURATION: 100 %

## 2022-08-31 DIAGNOSIS — I10 PRIMARY HYPERTENSION: Primary | ICD-10-CM

## 2022-08-31 DIAGNOSIS — F32.89 OTHER DEPRESSION: ICD-10-CM

## 2022-08-31 DIAGNOSIS — F41.9 ANXIETY: ICD-10-CM

## 2022-08-31 PROCEDURE — 99214 OFFICE O/P EST MOD 30 MIN: CPT | Performed by: INTERNAL MEDICINE

## 2022-08-31 RX ORDER — PANTOPRAZOLE SODIUM 40 MG/1
TABLET, DELAYED RELEASE ORAL
Qty: 90 TABLET | Refills: 0 | Status: SHIPPED | OUTPATIENT
Start: 2022-08-31 | End: 2022-12-02

## 2022-08-31 NOTE — PROGRESS NOTES
Patient is a 57 y.o. female who is here for anxiety.  Chief Complaint   Patient presents with   • Anxiety         HPI:    Here for mgmt of Anxiety and Depression.  Has bad mood swings.  Was placed on phenteramine for wt loss.  Has started back to working out.  Getting hormone pellets.  Easily agitated.  Very anxious.  Gets upset easily.    Sleeping well.  No fever or chills.  No abdominal pains.  Starting to exercise, recovering from surgery. GERD is well controlled.      History:     Patient Active Problem List   Diagnosis   • Anxiety   • Depression   • Hypertension   • Insomnia   • Pain in joint   • H/O traveler's diarrhea   • Boil of scalp   • Acute pain of right knee   • Fatigue   • Herpes zoster without complication       Past Medical History:   Diagnosis Date   • Arthritis    • Cancer (HCC)     Skin   • Cervical dysplasia    • H/O colonoscopy    • Ovarian cancer (HCC)        Past Surgical History:   Procedure Laterality Date   • AUGMENTATION MAMMAPLASTY     • BREAST EXCISIONAL BIOPSY Left    • DIAGNOSTIC LAPAROSCOPY     • FOOT SURGERY Left 02/2022    ORIF at    • KNEE SURGERY Right     right knee arthroscopy   • OOPHORECTOMY     • OTHER SURGICAL HISTORY      Facial surgery   • OTHER SURGICAL HISTORY      Leg repair   • TOTAL ABDOMINAL HYSTERECTOMY      removal of both ovaries       Current Outpatient Medications on File Prior to Visit   Medication Sig   • acetaminophen (TYLENOL) 500 MG tablet Take 1,000 mg by mouth Every 6 (Six) Hours As Needed.   • buPROPion XL (Wellbutrin XL) 150 MG 24 hr tablet Take 1 tablet by mouth Daily.   • celecoxib (CeleBREX) 200 MG capsule TAKE 1 CAPSULE BY MOUTH ONCE DAILY AS NEEDED FOR MODERATE PAIN   • HYDROcodone-acetaminophen (NORCO) 5-325 MG per tablet Take 1 tablet by mouth Every 6 (Six) Hours As Needed for Severe Pain .   • ondansetron (Zofran) 4 MG tablet Take 1 tablet by mouth Every 8 (Eight) Hours As Needed for Nausea.   • pantoprazole (PROTONIX) 40 MG EC tablet Take 1  tablet by mouth once daily   • triamterene-hydrochlorothiazide (MAXZIDE-25) 37.5-25 MG per tablet Take 1 tablet by mouth once daily     No current facility-administered medications on file prior to visit.       Family History   Problem Relation Age of Onset   • Mental illness Mother         mental disorder   • Breast cancer Mother 54   • Hypertension Father    • Heart attack Father    • Breast cancer Maternal Aunt    • Breast cancer Maternal Aunt    • Ovarian cancer Sister        Social History     Socioeconomic History   • Marital status:    Tobacco Use   • Smoking status: Never Smoker   • Smokeless tobacco: Never Used   Substance and Sexual Activity   • Alcohol use: Yes   • Drug use: No   • Sexual activity: Defer         Review of Systems   Constitutional: Negative for chills, fatigue, fever and unexpected weight change.   HENT: Negative for congestion, ear pain, hearing loss, rhinorrhea, sinus pressure, sore throat and trouble swallowing.    Eyes: Negative for discharge and itching.   Respiratory: Negative for cough, chest tightness and shortness of breath.    Cardiovascular: Negative for chest pain, palpitations and leg swelling.   Gastrointestinal: Negative for abdominal pain, blood in stool, constipation, diarrhea and vomiting.        1/19 colonoscopy without polyps at [facility]   Endocrine: Negative for polydipsia and polyuria.   Genitourinary: Negative for difficulty urinating, dysuria, enuresis, frequency, hematuria and urgency.        2/22 mammogram   Musculoskeletal: Negative for arthralgias, back pain, gait problem and joint swelling.   Skin: Negative for wound.   Allergic/Immunologic: Negative for immunocompromised state.   Neurological: Positive for numbness. Negative for dizziness, syncope, weakness, light-headedness and headaches.   Hematological: Does not bruise/bleed easily.   Psychiatric/Behavioral: Positive for dysphoric mood. Negative for behavioral problems and sleep disturbance. The patient  "is nervous/anxious.        /78   Pulse 83   Temp 96.9 °F (36.1 °C) (Infrared)   Ht 172.7 cm (67.99\")   LMP  (LMP Unknown)   SpO2 100%   BMI 31.33 kg/m²       Physical Exam  Constitutional:       Appearance: Normal appearance. She is well-developed. She is obese.   HENT:      Head: Normocephalic and atraumatic.      Right Ear: External ear normal.      Left Ear: External ear normal.      Nose: Nose normal.      Mouth/Throat:      Mouth: Mucous membranes are moist.      Pharynx: Oropharynx is clear.   Eyes:      Extraocular Movements: Extraocular movements intact.      Conjunctiva/sclera: Conjunctivae normal.      Pupils: Pupils are equal, round, and reactive to light.   Cardiovascular:      Rate and Rhythm: Normal rate and regular rhythm.      Heart sounds: Normal heart sounds.   Pulmonary:      Effort: Pulmonary effort is normal.      Breath sounds: Normal breath sounds.   Abdominal:      General: Bowel sounds are normal.      Palpations: Abdomen is soft.   Musculoskeletal:         General: Normal range of motion.      Cervical back: Normal range of motion and neck supple.   Lymphadenopathy:      Cervical: No cervical adenopathy.   Skin:     General: Skin is warm and dry.   Neurological:      General: No focal deficit present.      Mental Status: She is alert and oriented to person, place, and time.   Psychiatric:         Mood and Affect: Mood normal.         Behavior: Behavior normal.         Thought Content: Thought content normal.         Procedure:      Discussion/Summary:      Depression/anxiety-add zoloft to wellbutrin  joint pain/DJD-advised to limit narc, tylenol prn  insomnia-cont prn ambien  htn/edema-stable on Maxzide  Situational anxiety-cont 1/2 of hydroxyzine prn      8/31 labs noted and dw patient     Current Outpatient Medications:   •  acetaminophen (TYLENOL) 500 MG tablet, Take 1,000 mg by mouth Every 6 (Six) Hours As Needed., Disp: , Rfl:   •  buPROPion XL (Wellbutrin XL) 150 MG 24 hr " tablet, Take 1 tablet by mouth Daily., Disp: 90 tablet, Rfl: 2  •  celecoxib (CeleBREX) 200 MG capsule, TAKE 1 CAPSULE BY MOUTH ONCE DAILY AS NEEDED FOR MODERATE PAIN, Disp: 90 capsule, Rfl: 0  •  HYDROcodone-acetaminophen (NORCO) 5-325 MG per tablet, Take 1 tablet by mouth Every 6 (Six) Hours As Needed for Severe Pain ., Disp: 12 tablet, Rfl: 0  •  ondansetron (Zofran) 4 MG tablet, Take 1 tablet by mouth Every 8 (Eight) Hours As Needed for Nausea., Disp: 10 tablet, Rfl: 1  •  pantoprazole (PROTONIX) 40 MG EC tablet, Take 1 tablet by mouth once daily, Disp: 90 tablet, Rfl: 1  •  triamterene-hydrochlorothiazide (MAXZIDE-25) 37.5-25 MG per tablet, Take 1 tablet by mouth once daily, Disp: 90 tablet, Rfl: 0  •  sertraline (Zoloft) 50 MG tablet, Take 1 tablet by mouth Daily., Disp: 90 tablet, Rfl: 3        Diagnoses and all orders for this visit:    1. Primary hypertension (Primary)  -     CBC (No Diff)  -     Comprehensive Metabolic Panel  -     TSH    2. Anxiety  -     Vitamin B12    3. Other depression  -     Vitamin B12  -     sertraline (Zoloft) 50 MG tablet; Take 1 tablet by mouth Daily.  Dispense: 90 tablet; Refill: 3

## 2022-09-01 LAB
ALBUMIN SERPL-MCNC: 4.4 G/DL (ref 3.5–5.2)
ALBUMIN/GLOB SERPL: 1.9 G/DL
ALP SERPL-CCNC: 56 U/L (ref 39–117)
ALT SERPL-CCNC: 17 U/L (ref 1–33)
AST SERPL-CCNC: 14 U/L (ref 1–32)
BILIRUB SERPL-MCNC: 0.4 MG/DL (ref 0–1.2)
BUN SERPL-MCNC: 17 MG/DL (ref 6–20)
BUN/CREAT SERPL: 20.2 (ref 7–25)
CALCIUM SERPL-MCNC: 9.7 MG/DL (ref 8.6–10.5)
CHLORIDE SERPL-SCNC: 100 MMOL/L (ref 98–107)
CO2 SERPL-SCNC: 26 MMOL/L (ref 22–29)
CREAT SERPL-MCNC: 0.84 MG/DL (ref 0.57–1)
EGFRCR-CYS SERPLBLD CKD-EPI 2021: 80.2 ML/MIN/1.73
ERYTHROCYTE [DISTWIDTH] IN BLOOD BY AUTOMATED COUNT: 13 % (ref 12.3–15.4)
GLOBULIN SER CALC-MCNC: 2.3 GM/DL
GLUCOSE SERPL-MCNC: 80 MG/DL (ref 65–99)
HCT VFR BLD AUTO: 42 % (ref 34–46.6)
HGB BLD-MCNC: 14.1 G/DL (ref 12–15.9)
MCH RBC QN AUTO: 33.2 PG (ref 26.6–33)
MCHC RBC AUTO-ENTMCNC: 33.6 G/DL (ref 31.5–35.7)
MCV RBC AUTO: 98.8 FL (ref 79–97)
PLATELET # BLD AUTO: 269 10*3/MM3 (ref 140–450)
POTASSIUM SERPL-SCNC: 4.2 MMOL/L (ref 3.5–5.2)
PROT SERPL-MCNC: 6.7 G/DL (ref 6–8.5)
RBC # BLD AUTO: 4.25 10*6/MM3 (ref 3.77–5.28)
SODIUM SERPL-SCNC: 136 MMOL/L (ref 136–145)
TSH SERPL DL<=0.005 MIU/L-ACNC: 3.08 UIU/ML (ref 0.27–4.2)
VIT B12 SERPL-MCNC: 537 PG/ML (ref 211–946)
WBC # BLD AUTO: 5.47 10*3/MM3 (ref 3.4–10.8)

## 2022-10-04 RX ORDER — BUPROPION HYDROCHLORIDE 150 MG/1
TABLET ORAL
Qty: 90 TABLET | Refills: 0 | Status: SHIPPED | OUTPATIENT
Start: 2022-10-04 | End: 2023-01-09 | Stop reason: SDUPTHER

## 2022-10-18 RX ORDER — CELECOXIB 200 MG/1
CAPSULE ORAL
Qty: 90 CAPSULE | Refills: 0 | Status: SHIPPED | OUTPATIENT
Start: 2022-10-18 | End: 2023-01-09 | Stop reason: SDUPTHER

## 2022-11-15 RX ORDER — TRIAMTERENE AND HYDROCHLOROTHIAZIDE 37.5; 25 MG/1; MG/1
TABLET ORAL
Qty: 90 TABLET | Refills: 0 | Status: SHIPPED | OUTPATIENT
Start: 2022-11-15 | End: 2023-02-21

## 2022-11-22 ENCOUNTER — TELEPHONE (OUTPATIENT)
Dept: INTERNAL MEDICINE | Facility: CLINIC | Age: 57
End: 2022-11-22

## 2022-11-22 ENCOUNTER — TELEPHONE (OUTPATIENT)
Dept: PEDIATRICS | Facility: OTHER | Age: 57
End: 2022-11-22

## 2022-11-22 RX ORDER — FAMCICLOVIR 500 MG/1
500 TABLET ORAL 3 TIMES DAILY
Qty: 21 TABLET | Refills: 0 | Status: SHIPPED | OUTPATIENT
Start: 2022-11-22 | End: 2022-11-22 | Stop reason: SDUPTHER

## 2022-11-22 RX ORDER — FAMCICLOVIR 500 MG/1
500 TABLET ORAL 3 TIMES DAILY
Qty: 21 TABLET | Refills: 0 | Status: SHIPPED | OUTPATIENT
Start: 2022-11-22

## 2022-11-22 NOTE — TELEPHONE ENCOUNTER
Caller: Vanna Black    Relationship: Self    Best call back number: [phone]    What was the call regarding: PATIENT STATES THAT SHE NEEDS REFILL FOR FAMVIR SENT TODAY. PATIENT STATES THAT SHE WILL BE GOING OUT OF TOWN IN THE MORNING    Do you require a callback: YES

## 2022-11-22 NOTE — TELEPHONE ENCOUNTER
Caller: Vanna Black    Relationship: Self    Best call back number: [phone]     What medication are you requesting:What are your current symptoms: BLISTERING RASH WITH PAIN, WAIST, STATES HAS SHINGLES BEFORE    How long have you been experiencing symptoms: 6 DAYS  Have you had these symptoms before:    [x] Yes  [] No    Have you been treated for these symptoms before:   [x] Yes  [] No    If a prescription is needed, what is your preferred pharmacy and phone number: [address] [phone] [facility] [phone] FX     Additional notes:  STATES HAS HAD SHINGLES BEFORE

## 2022-12-02 RX ORDER — PANTOPRAZOLE SODIUM 40 MG/1
TABLET, DELAYED RELEASE ORAL
Qty: 90 TABLET | Refills: 0 | Status: SHIPPED | OUTPATIENT
Start: 2022-12-02 | End: 2023-03-01

## 2023-01-09 RX ORDER — BUPROPION HYDROCHLORIDE 150 MG/1
150 TABLET ORAL DAILY
Qty: 90 TABLET | Refills: 0 | Status: SHIPPED | OUTPATIENT
Start: 2023-01-09 | End: 2023-01-10 | Stop reason: SDUPTHER

## 2023-01-09 RX ORDER — CELECOXIB 200 MG/1
200 CAPSULE ORAL DAILY
Qty: 90 CAPSULE | Refills: 0 | Status: SHIPPED | OUTPATIENT
Start: 2023-01-09 | End: 2023-01-10 | Stop reason: SDUPTHER

## 2023-01-09 NOTE — TELEPHONE ENCOUNTER
Caller: Vanna Black    Relationship: Self    Best call back number: [phone]    Requested Prescriptions:   Requested Prescriptions     Pending Prescriptions Disp Refills   • celecoxib (CeleBREX) 200 MG capsule 90 capsule 0   • buPROPion XL (WELLBUTRIN XL) 150 MG 24 hr tablet 90 tablet 0     Sig: Take 1 tablet by mouth Daily.        Pharmacy where request should be sent: [facility],  [address] [place] [address], PH: [phone]    Additional details provided by patient: PATIENT HAS FIVE DAYS LEFT.    Does the patient have less than a 3 day supply:  [] Yes  [x] No    Would you like a call back once the refill request has been completed: [x] Yes [] No    If the office needs to give you a call back, can they leave a voicemail: [x] Yes [] No    Manju Horowitz Rep   01/09/23 11:31 EST

## 2023-01-10 ENCOUNTER — TELEPHONE (OUTPATIENT)
Dept: INTERNAL MEDICINE | Facility: CLINIC | Age: 58
End: 2023-01-10
Payer: COMMERCIAL

## 2023-01-10 RX ORDER — BUPROPION HYDROCHLORIDE 150 MG/1
150 TABLET ORAL DAILY
Qty: 90 TABLET | Refills: 0 | Status: SHIPPED | OUTPATIENT
Start: 2023-01-10

## 2023-01-10 RX ORDER — CELECOXIB 200 MG/1
200 CAPSULE ORAL DAILY
Qty: 90 CAPSULE | Refills: 0 | Status: SHIPPED | OUTPATIENT
Start: 2023-01-10

## 2023-01-10 NOTE — TELEPHONE ENCOUNTER
CALL ROSALVA. SHE IS OUT OF TOWN AND HER MEDS NEED TO BE CALLED IN SOMEPLACE IN [place]. SHE WANTED TO TALK TO YOU.

## 2023-01-11 ENCOUNTER — TELEPHONE (OUTPATIENT)
Dept: INTERNAL MEDICINE | Facility: CLINIC | Age: 58
End: 2023-01-11
Payer: COMMERCIAL

## 2023-01-11 NOTE — TELEPHONE ENCOUNTER
Caller: Vanna Black    Relationship: Self    Best call back number: [phone]    What test/procedure requested: PA FOR THE CELEBREX 200 MG CAP    When is it needed: ASAP    Additional information or concerns: PATIENT IS IN [place]. AND NEED A PA SENT TO THEM FOR THE celecoxib (CeleBREX) 200 MG capsule.      PATIENT WOULD LIKE FOR THE PA TO BE SENT IN TO [address] [phone] [facility] [phone] FX.    PLEASE CALL  PATIENT ONCE THE PA IS APPROVED AND SENT IN

## 2023-02-21 RX ORDER — TRIAMTERENE AND HYDROCHLOROTHIAZIDE 37.5; 25 MG/1; MG/1
TABLET ORAL
Qty: 90 TABLET | Refills: 0 | Status: SHIPPED | OUTPATIENT
Start: 2023-02-21

## 2023-03-01 RX ORDER — PANTOPRAZOLE SODIUM 40 MG/1
TABLET, DELAYED RELEASE ORAL
Qty: 90 TABLET | Refills: 0 | Status: SHIPPED | OUTPATIENT
Start: 2023-03-01

## 2023-03-23 ENCOUNTER — TRANSCRIBE ORDERS (OUTPATIENT)
Dept: ADMINISTRATIVE | Facility: HOSPITAL | Age: 58
End: 2023-03-23
Payer: COMMERCIAL

## 2023-03-23 DIAGNOSIS — Z12.31 VISIT FOR SCREENING MAMMOGRAM: Primary | ICD-10-CM

## 2023-05-18 RX ORDER — CELECOXIB 200 MG/1
200 CAPSULE ORAL DAILY
Qty: 90 CAPSULE | Refills: 0 | Status: SHIPPED | OUTPATIENT
Start: 2023-05-18

## 2023-05-18 RX ORDER — TRIAMTERENE AND HYDROCHLOROTHIAZIDE 37.5; 25 MG/1; MG/1
TABLET ORAL
Qty: 90 TABLET | Refills: 0 | Status: SHIPPED | OUTPATIENT
Start: 2023-05-18

## 2023-05-30 ENCOUNTER — HOSPITAL ENCOUNTER (OUTPATIENT)
Dept: MAMMOGRAPHY | Facility: HOSPITAL | Age: 58
Discharge: HOME | End: 2023-05-30
Admitting: NURSE PRACTITIONER

## 2023-05-30 DIAGNOSIS — Z12.31 VISIT FOR SCREENING MAMMOGRAM: ICD-10-CM

## 2023-05-30 PROCEDURE — 77063 BREAST TOMOSYNTHESIS BI: CPT

## 2023-05-30 PROCEDURE — 77067 SCR MAMMO BI INCL CAD: CPT

## 2023-08-14 DIAGNOSIS — F32.89 OTHER DEPRESSION: ICD-10-CM

## 2023-08-14 RX ORDER — TRIAMTERENE AND HYDROCHLOROTHIAZIDE 37.5; 25 MG/1; MG/1
TABLET ORAL
Qty: 90 TABLET | Refills: 0 | Status: SHIPPED | OUTPATIENT
Start: 2023-08-14

## 2023-08-14 RX ORDER — PANTOPRAZOLE SODIUM 40 MG/1
TABLET, DELAYED RELEASE ORAL
Qty: 90 TABLET | Refills: 0 | OUTPATIENT
Start: 2023-08-14

## 2023-08-14 RX ORDER — CELECOXIB 200 MG/1
200 CAPSULE ORAL DAILY
Qty: 90 CAPSULE | Refills: 0 | Status: SHIPPED | OUTPATIENT
Start: 2023-08-14

## 2023-08-14 RX ORDER — BUPROPION HYDROCHLORIDE 150 MG/1
TABLET ORAL
Qty: 90 TABLET | Refills: 0 | Status: SHIPPED | OUTPATIENT
Start: 2023-08-14

## 2023-08-14 RX ORDER — PANTOPRAZOLE SODIUM 40 MG/1
TABLET, DELAYED RELEASE ORAL
Qty: 90 TABLET | Refills: 0 | Status: SHIPPED | OUTPATIENT
Start: 2023-08-14

## 2023-10-26 ENCOUNTER — TELEPHONE (OUTPATIENT)
Dept: INTERNAL MEDICINE | Facility: CLINIC | Age: 58
End: 2023-10-26
Payer: COMMERCIAL

## 2023-10-26 RX ORDER — DIAZEPAM 2 MG/1
2 TABLET ORAL 2 TIMES DAILY PRN
Qty: 15 TABLET | Refills: 0 | Status: SHIPPED | OUTPATIENT
Start: 2023-10-26

## 2023-10-26 NOTE — TELEPHONE ENCOUNTER
"Caller: Vanna Black \"Celia\"    Relationship: Self    Best call back number: [phone]     What medication are you requesting: VALIUM    What are your current symptoms: FOR SURGERY, MOHS PROCEDURE    If a prescription is needed, what is your preferred pharmacy and phone number:    [facility] [address] [facility] [address] [facility] [phone] [facility] [phone]       Additional notes: SHE IS HAVING A PROCEDURE ON 11/9 AND IS LEAVING TO DRIVE TO [place] ON 11/4. SHE WOULD LIKE THIS FOR THE PROCEDURE. THE MOHS PROCEDURE SHE WILL BE AWAKE.    "

## 2023-10-26 NOTE — TELEPHONE ENCOUNTER
"Caller: Vanna Black \"Celia\"    Relationship: Self    Best call back number: [phone]     What is the medical concern/diagnosis: HORMONE PELLETS    What specialty or service is being requested: GYN    What is the provider, practice or medical service name:     What is the office location: [place], [place]    What is the office phone number:     Any additional details: SHE PURCHASED A HOME IN [place], [place] AND IS LOOKING FOR A RECOMMENDATION FROM DR CORTEZ.  "

## 2023-11-21 RX ORDER — TRIAMTERENE AND HYDROCHLOROTHIAZIDE 37.5; 25 MG/1; MG/1
1 TABLET ORAL DAILY
Qty: 90 TABLET | Refills: 0 | Status: SHIPPED | OUTPATIENT
Start: 2023-11-21

## 2023-11-21 RX ORDER — CELECOXIB 200 MG/1
200 CAPSULE ORAL DAILY
Qty: 90 CAPSULE | Refills: 0 | Status: SHIPPED | OUTPATIENT
Start: 2023-11-21

## 2023-12-04 DIAGNOSIS — F32.89 OTHER DEPRESSION: ICD-10-CM

## 2023-12-04 RX ORDER — PANTOPRAZOLE SODIUM 40 MG/1
40 TABLET, DELAYED RELEASE ORAL DAILY
Qty: 90 TABLET | Refills: 0 | Status: SHIPPED | OUTPATIENT
Start: 2023-12-04

## 2023-12-04 RX ORDER — BUPROPION HYDROCHLORIDE 150 MG/1
150 TABLET ORAL DAILY
Qty: 90 TABLET | Refills: 0 | Status: SHIPPED | OUTPATIENT
Start: 2023-12-04 | End: 2024-03-03

## 2023-12-04 NOTE — TELEPHONE ENCOUNTER
"  Caller: Vanna Black \"Celia\"    Relationship: Self    Best call back number: [phone]     Requested Prescriptions:   Requested Prescriptions     Pending Prescriptions Disp Refills    pantoprazole (PROTONIX) 40 MG EC tablet 90 tablet 0     Sig: Take 1 tablet by mouth Daily.    sertraline (ZOLOFT) 50 MG tablet 90 tablet 0     Sig: Take 1 tablet by mouth Daily.    buPROPion XL (WELLBUTRIN XL) 150 MG 24 hr tablet 90 tablet 0     Sig: Take 1 tablet by mouth Daily.        Pharmacy where request should be sent: [address] [address] [address] [address] [phone] [facility] [phone] FX     Last office visit with prescribing clinician: 8/31/2022   Last telemedicine visit with prescribing clinician: Visit date not found   Next office visit with prescribing clinician: 03/26/2024    Additional details provided by patient: THE PATIENT ADVISED THAT SHE IS IN [place] UNTIL 03/2024,HAS 4 DAYS REMAINING AND IS REQUESTING 90 DAY SUPPLIES WITH AN ADDITIONAL REFILL     Does the patient have less than a 3 day supply:  [] Yes  [x] No    Would you like a call back once the refill request has been completed: [] Yes [x] No    If the office needs to give you a call back, can they leave a voicemail: [] Yes [x] No    Manju Rodriguez Rep   12/04/23 13:28 EST       "

## 2024-02-20 RX ORDER — CELECOXIB 200 MG/1
200 CAPSULE ORAL DAILY
Qty: 90 CAPSULE | Refills: 0 | Status: SHIPPED | OUTPATIENT
Start: 2024-02-20

## 2024-02-20 RX ORDER — TRIAMTERENE AND HYDROCHLOROTHIAZIDE 37.5; 25 MG/1; MG/1
1 TABLET ORAL DAILY
Qty: 90 TABLET | Refills: 0 | Status: SHIPPED | OUTPATIENT
Start: 2024-02-20

## 2024-03-02 DIAGNOSIS — F32.89 OTHER DEPRESSION: ICD-10-CM

## 2024-03-26 ENCOUNTER — OFFICE VISIT (OUTPATIENT)
Dept: INTERNAL MEDICINE | Facility: CLINIC | Age: 59
End: 2024-03-26
Payer: COMMERCIAL

## 2024-03-26 VITALS
SYSTOLIC BLOOD PRESSURE: 120 MMHG | DIASTOLIC BLOOD PRESSURE: 80 MMHG | BODY MASS INDEX: 31.33 KG/M2 | OXYGEN SATURATION: 98 % | HEIGHT: 68 IN | HEART RATE: 74 BPM

## 2024-03-26 DIAGNOSIS — F32.A DEPRESSION, UNSPECIFIED DEPRESSION TYPE: ICD-10-CM

## 2024-03-26 DIAGNOSIS — M25.50 ARTHRALGIA, UNSPECIFIED JOINT: ICD-10-CM

## 2024-03-26 DIAGNOSIS — I10 PRIMARY HYPERTENSION: Primary | ICD-10-CM

## 2024-03-26 NOTE — PROGRESS NOTES
Patient is a 59 y.o. female who is here for a follow up of hypertension.  Chief Complaint   Patient presents with    Hypertension         HPI:    Here for mgmt of HTN and A/D.  Issues with libido.  Sleeping fine.  No dizziness or lightheadedness.  No HAs.  No abdominal pains.  No ankle edema.  GERD is controlled.      History:     Patient Active Problem List   Diagnosis    Anxiety    Depression    Hypertension    Insomnia    Pain in joint    H/O traveler's diarrhea    Boil of scalp    Acute pain of right knee    Fatigue    Herpes zoster without complication       Past Medical History:   Diagnosis Date    Arthritis     Cancer     Skin    Cervical dysplasia     H/O colonoscopy     Ovarian cancer        Past Surgical History:   Procedure Laterality Date    AUGMENTATION MAMMAPLASTY Bilateral     Redone 3/2022    BREAST EXCISIONAL BIOPSY Left     DIAGNOSTIC LAPAROSCOPY      FOOT SURGERY Left 02/2022    ORIF at     KNEE SURGERY Right     right knee arthroscopy    OOPHORECTOMY      OTHER SURGICAL HISTORY      Facial surgery    OTHER SURGICAL HISTORY      Leg repair    TOTAL ABDOMINAL HYSTERECTOMY      removal of both ovaries       Current Outpatient Medications on File Prior to Visit   Medication Sig    acetaminophen (TYLENOL) 500 MG tablet Take 2 tablets by mouth Every 6 (Six) Hours As Needed.    buPROPion XL (WELLBUTRIN XL) 150 MG 24 hr tablet Take 1 tablet by mouth Daily for 90 days.    celecoxib (CeleBREX) 200 MG capsule Take 1 capsule by mouth Daily.    diazePAM (Valium) 2 MG tablet Take 1 tablet by mouth 2 (Two) Times a Day As Needed for Anxiety. Do not drive on meds    HYDROcodone-acetaminophen (NORCO) 5-325 MG per tablet Take 1 tablet by mouth Every 6 (Six) Hours As Needed for Severe Pain .    ondansetron (Zofran) 4 MG tablet Take 1 tablet by mouth Every 8 (Eight) Hours As Needed for Nausea.    pantoprazole (PROTONIX) 40 MG EC tablet Take 1 tablet by mouth Daily.    sertraline (ZOLOFT) 50 MG tablet Take 1 tablet  by mouth once daily    triamterene-hydrochlorothiazide (MAXZIDE-25) 37.5-25 MG per tablet Take 1 tablet by mouth Daily.    famciclovir (FAMVIR) 500 MG tablet Take 1 tablet by mouth 3 (Three) Times a Day. (Patient not taking: Reported on 3/26/2024)     No current facility-administered medications on file prior to visit.       Family History   Problem Relation Age of Onset    Mental illness Mother         mental disorder    Breast cancer Mother 54    Hypertension Father     Heart attack Father     Breast cancer Maternal Aunt     Breast cancer Maternal Aunt     Ovarian cancer Sister        Social History     Socioeconomic History    Marital status:    Tobacco Use    Smoking status: Never    Smokeless tobacco: Never   Vaping Use    Vaping status: Never Used   Substance and Sexual Activity    Alcohol use: Yes    Drug use: No    Sexual activity: Defer         Review of Systems   Constitutional:  Negative for chills, fatigue, fever and unexpected weight change.   HENT:  Negative for congestion, ear pain, hearing loss, rhinorrhea, sinus pressure, sore throat and trouble swallowing.    Eyes:  Negative for discharge and itching.   Respiratory:  Negative for cough, chest tightness and shortness of breath.    Cardiovascular:  Negative for chest pain, palpitations and leg swelling.   Gastrointestinal:  Negative for abdominal pain, blood in stool, constipation, diarrhea and vomiting.        1/19 colonoscopy without polyps at [facility]   Endocrine: Negative for polydipsia and polyuria.   Genitourinary:  Negative for difficulty urinating, dysuria, enuresis, frequency, hematuria and urgency.        5/23 mammogram   Musculoskeletal:  Negative for arthralgias, back pain, gait problem and joint swelling.   Skin:  Negative for wound.   Allergic/Immunologic: Negative for immunocompromised state.   Neurological:  Positive for numbness. Negative for dizziness, syncope, weakness, light-headedness and headaches.   Hematological:  Does not  "bruise/bleed easily.   Psychiatric/Behavioral:  Positive for dysphoric mood. Negative for behavioral problems and sleep disturbance. The patient is nervous/anxious.        /80   Pulse 74   Ht 172.7 cm (67.99\")   LMP  (LMP Unknown)   SpO2 98%   BMI 31.33 kg/m²       Physical Exam  Constitutional:       Appearance: Normal appearance. She is well-developed. She is obese.   HENT:      Head: Normocephalic and atraumatic.      Right Ear: External ear normal.      Left Ear: External ear normal.      Nose: Nose normal.      Mouth/Throat:      Mouth: Mucous membranes are moist.      Pharynx: Oropharynx is clear.   Eyes:      Extraocular Movements: Extraocular movements intact.      Conjunctiva/sclera: Conjunctivae normal.      Pupils: Pupils are equal, round, and reactive to light.   Cardiovascular:      Rate and Rhythm: Normal rate and regular rhythm.      Heart sounds: Normal heart sounds.   Pulmonary:      Effort: Pulmonary effort is normal.      Breath sounds: Normal breath sounds.   Abdominal:      General: Bowel sounds are normal.      Palpations: Abdomen is soft.   Musculoskeletal:         General: Normal range of motion.      Cervical back: Normal range of motion and neck supple.   Lymphadenopathy:      Cervical: No cervical adenopathy.   Skin:     General: Skin is warm and dry.   Neurological:      General: No focal deficit present.      Mental Status: She is alert and oriented to person, place, and time.   Psychiatric:         Mood and Affect: Mood normal.         Behavior: Behavior normal.         Thought Content: Thought content normal.         Procedure:      Discussion/Summary:    Depression/anxiety-cont zoloft and wellbutrin combo  joint pain/DJD-advised to limit narc, tylenol prn  insomnia-cont prn ambien  htn/edema-stable on Maxzide  Situational anxiety-cont 1/2 of hydroxyzine prn      3/26 labs noted and dw patient     Current Outpatient Medications:     acetaminophen (TYLENOL) 500 MG tablet, " Take 2 tablets by mouth Every 6 (Six) Hours As Needed., Disp: , Rfl:     buPROPion XL (WELLBUTRIN XL) 150 MG 24 hr tablet, Take 1 tablet by mouth Daily for 90 days., Disp: 90 tablet, Rfl: 0    celecoxib (CeleBREX) 200 MG capsule, Take 1 capsule by mouth Daily., Disp: 90 capsule, Rfl: 0    diazePAM (Valium) 2 MG tablet, Take 1 tablet by mouth 2 (Two) Times a Day As Needed for Anxiety. Do not drive on meds, Disp: 15 tablet, Rfl: 0    HYDROcodone-acetaminophen (NORCO) 5-325 MG per tablet, Take 1 tablet by mouth Every 6 (Six) Hours As Needed for Severe Pain ., Disp: 12 tablet, Rfl: 0    ondansetron (Zofran) 4 MG tablet, Take 1 tablet by mouth Every 8 (Eight) Hours As Needed for Nausea., Disp: 10 tablet, Rfl: 1    pantoprazole (PROTONIX) 40 MG EC tablet, Take 1 tablet by mouth Daily., Disp: 90 tablet, Rfl: 0    sertraline (ZOLOFT) 50 MG tablet, Take 1 tablet by mouth once daily, Disp: 90 tablet, Rfl: 0    triamterene-hydrochlorothiazide (MAXZIDE-25) 37.5-25 MG per tablet, Take 1 tablet by mouth Daily., Disp: 90 tablet, Rfl: 0    famciclovir (FAMVIR) 500 MG tablet, Take 1 tablet by mouth 3 (Three) Times a Day. (Patient not taking: Reported on 3/26/2024), Disp: 21 tablet, Rfl: 0        Diagnoses and all orders for this visit:    1. Primary hypertension (Primary)    2. Depression, unspecified depression type    3. Arthralgia, unspecified joint

## 2024-04-02 RX ORDER — PANTOPRAZOLE SODIUM 40 MG/1
40 TABLET, DELAYED RELEASE ORAL DAILY
Qty: 90 TABLET | Refills: 0 | Status: SHIPPED | OUTPATIENT
Start: 2024-04-02

## 2024-04-03 LAB
ALBUMIN SERPL-MCNC: 4.5 G/DL (ref 3.9–4.9)
ALBUMIN/GLOB SERPL: 2 {RATIO} (ref 1.2–2.2)
ALP SERPL-CCNC: 58 IU/L (ref 44–121)
ALT SERPL-CCNC: 15 IU/L (ref 0–32)
AMBIG ABBREV CMP14 DEFAULT: NORMAL
AST SERPL-CCNC: 17 IU/L (ref 0–40)
BASOPHILS # BLD AUTO: 0 X10E3/UL (ref 0–0.2)
BASOPHILS NFR BLD AUTO: 1 %
BILIRUB SERPL-MCNC: 0.4 MG/DL (ref 0–1.2)
BUN SERPL-MCNC: 19 MG/DL (ref 8–27)
BUN/CREAT SERPL: 22 (ref 12–28)
CALCIUM SERPL-MCNC: 9.6 MG/DL (ref 8.7–10.3)
CHLORIDE SERPL-SCNC: 102 MMOL/L (ref 96–106)
CO2 SERPL-SCNC: 25 MMOL/L (ref 20–29)
CREAT SERPL-MCNC: 0.86 MG/DL (ref 0.57–1)
EGFRCR SERPLBLD CKD-EPI 2021: 77 ML/MIN/1.73
EOSINOPHIL # BLD AUTO: 0 X10E3/UL (ref 0–0.4)
EOSINOPHIL NFR BLD AUTO: 1 %
ERYTHROCYTE [DISTWIDTH] IN BLOOD BY AUTOMATED COUNT: 12.5 % (ref 11.7–15.4)
GLOBULIN SER CALC-MCNC: 2.2 G/DL (ref 1.5–4.5)
GLUCOSE SERPL-MCNC: 80 MG/DL (ref 70–99)
HBA1C MFR BLD: 5 % (ref 4.8–5.6)
HCT VFR BLD AUTO: 41.3 % (ref 34–46.6)
HGB BLD-MCNC: 14 G/DL (ref 11.1–15.9)
IMM GRANULOCYTES # BLD AUTO: 0 X10E3/UL (ref 0–0.1)
IMM GRANULOCYTES NFR BLD AUTO: 0 %
LYMPHOCYTES # BLD AUTO: 1.7 X10E3/UL (ref 0.7–3.1)
LYMPHOCYTES NFR BLD AUTO: 31 %
MCH RBC QN AUTO: 34.3 PG (ref 26.6–33)
MCHC RBC AUTO-ENTMCNC: 33.9 G/DL (ref 31.5–35.7)
MCV RBC AUTO: 101 FL (ref 79–97)
MONOCYTES # BLD AUTO: 0.4 X10E3/UL (ref 0.1–0.9)
MONOCYTES NFR BLD AUTO: 8 %
NEUTROPHILS # BLD AUTO: 3.4 X10E3/UL (ref 1.4–7)
NEUTROPHILS NFR BLD AUTO: 59 %
PLATELET # BLD AUTO: 247 X10E3/UL (ref 150–450)
POTASSIUM SERPL-SCNC: 4.2 MMOL/L (ref 3.5–5.2)
PROT SERPL-MCNC: 6.7 G/DL (ref 6–8.5)
RBC # BLD AUTO: 4.08 X10E6/UL (ref 3.77–5.28)
SODIUM SERPL-SCNC: 140 MMOL/L (ref 134–144)
TSH SERPL DL<=0.005 MIU/L-ACNC: 2.86 UIU/ML (ref 0.45–4.5)
WBC # BLD AUTO: 5.6 X10E3/UL (ref 3.4–10.8)

## 2024-04-26 RX ORDER — BUPROPION HYDROCHLORIDE 150 MG/1
150 TABLET ORAL DAILY
Qty: 90 TABLET | Refills: 0 | Status: SHIPPED | OUTPATIENT
Start: 2024-04-26

## 2024-05-06 RX ORDER — TRIAMTERENE AND HYDROCHLOROTHIAZIDE 37.5; 25 MG/1; MG/1
1 TABLET ORAL DAILY
Qty: 90 TABLET | Refills: 0 | Status: SHIPPED | OUTPATIENT
Start: 2024-05-06

## 2024-05-06 RX ORDER — CELECOXIB 200 MG/1
200 CAPSULE ORAL DAILY
Qty: 90 CAPSULE | Refills: 0 | Status: SHIPPED | OUTPATIENT
Start: 2024-05-06

## 2024-05-30 ENCOUNTER — TRANSCRIBE ORDERS (OUTPATIENT)
Dept: ADMINISTRATIVE | Facility: HOSPITAL | Age: 59
End: 2024-05-30
Payer: COMMERCIAL

## 2024-05-30 DIAGNOSIS — Z12.31 SCREENING MAMMOGRAM FOR BREAST CANCER: Primary | ICD-10-CM

## 2024-06-10 ENCOUNTER — OFFICE VISIT (OUTPATIENT)
Dept: ORTHOPEDIC SURGERY | Facility: CLINIC | Age: 59
End: 2024-06-10
Payer: COMMERCIAL

## 2024-06-10 VITALS
BODY MASS INDEX: 27.8 KG/M2 | HEIGHT: 66 IN | DIASTOLIC BLOOD PRESSURE: 82 MMHG | SYSTOLIC BLOOD PRESSURE: 132 MMHG | WEIGHT: 173 LBS

## 2024-06-10 DIAGNOSIS — M17.11 PRIMARY OSTEOARTHRITIS OF RIGHT KNEE: ICD-10-CM

## 2024-06-10 DIAGNOSIS — M25.561 RIGHT KNEE PAIN, UNSPECIFIED CHRONICITY: Primary | ICD-10-CM

## 2024-06-10 DIAGNOSIS — I10 PRIMARY HYPERTENSION: ICD-10-CM

## 2024-06-10 PROCEDURE — 99214 OFFICE O/P EST MOD 30 MIN: CPT | Performed by: ORTHOPAEDIC SURGERY

## 2024-06-10 PROCEDURE — 20610 DRAIN/INJ JOINT/BURSA W/O US: CPT | Performed by: ORTHOPAEDIC SURGERY

## 2024-06-10 RX ORDER — LIDOCAINE HYDROCHLORIDE 10 MG/ML
4 INJECTION, SOLUTION EPIDURAL; INFILTRATION; INTRACAUDAL; PERINEURAL
Status: COMPLETED | OUTPATIENT
Start: 2024-06-10 | End: 2024-06-10

## 2024-06-10 RX ORDER — PHENTERMINE HYDROCHLORIDE 37.5 MG/1
CAPSULE ORAL
COMMUNITY
Start: 2024-05-07

## 2024-06-10 RX ORDER — BUPIVACAINE HYDROCHLORIDE 2.5 MG/ML
4 INJECTION, SOLUTION EPIDURAL; INFILTRATION; INTRACAUDAL
Status: COMPLETED | OUTPATIENT
Start: 2024-06-10 | End: 2024-06-10

## 2024-06-10 RX ORDER — TRIAMCINOLONE ACETONIDE 40 MG/ML
40 INJECTION, SUSPENSION INTRA-ARTICULAR; INTRAMUSCULAR
Status: COMPLETED | OUTPATIENT
Start: 2024-06-10 | End: 2024-06-10

## 2024-06-10 RX ADMIN — BUPIVACAINE HYDROCHLORIDE 4 ML: 2.5 INJECTION, SOLUTION EPIDURAL; INFILTRATION; INTRACAUDAL at 08:18

## 2024-06-10 RX ADMIN — TRIAMCINOLONE ACETONIDE 40 MG: 40 INJECTION, SUSPENSION INTRA-ARTICULAR; INTRAMUSCULAR at 08:18

## 2024-06-10 RX ADMIN — LIDOCAINE HYDROCHLORIDE 4 ML: 10 INJECTION, SOLUTION EPIDURAL; INFILTRATION; INTRACAUDAL; PERINEURAL at 08:18

## 2024-06-10 NOTE — PROGRESS NOTES
Procedure   - Large Joint Arthrocentesis: R knee on 6/10/2024 8:18 AM  Indications: pain  Details: 21 G needle, anterolateral approach  Medications: 40 mg triamcinolone acetonide 40 MG/ML; 4 mL lidocaine PF 1% 1 %; 4 mL bupivacaine (PF) 0.25 %  Outcome: tolerated well, no immediate complications  Procedure, treatment alternatives, risks and benefits explained, specific risks discussed. Consent was given by the patient. Immediately prior to procedure a time out was called to verify the correct patient, procedure, equipment, support staff and site/side marked as required. Patient was prepped and draped in the usual sterile fashion.

## 2024-06-10 NOTE — PROGRESS NOTES
"    [facility] [place] [facility]opa[facility] Surgery Clinic Note        Subjective     CC: Follow-up (2 year follow up - right knee pain, Right knee arthroscopy (DOS: 5/17/22))      VERONICA Black is a 59 y.o. female.  She complains of right knee pain.  She has had it for 3 months.  She had a history of a tibial yifan put in many many years ago.  I last saw her May 2022 and did a right knee arthroscopy.    Overall, patient's symptoms are worsening in the right knee.  She did have a cortisone injection about 2 years ago.  No recent treatment.  She has modified her activities.  She is not interested in surgery.    ROS:    Constiutional:Pt denies fever, chills, nausea, or vomiting.  MSK:as above        Objective      Past Medical History  Past Medical History:   Diagnosis Date    Arthritis     Cancer     Skin    Cervical dysplasia     H/O colonoscopy     Ovarian cancer      Social History     Socioeconomic History    Marital status:    Tobacco Use    Smoking status: Never    Smokeless tobacco: Never   Vaping Use    Vaping status: Never Used   Substance and Sexual Activity    Alcohol use: Yes    Drug use: No    Sexual activity: Defer          Physical Exam  /82   Ht 168.5 cm (66.34\")   Wt 78.5 kg (173 lb)   LMP  (LMP Unknown)   BMI 27.64 kg/m²     Body mass index is 27.64 kg/m².    Patient is well nourished and well developed.        Ortho Exam  Significant tenderness medial joint line right knee.  Trace effusion.  Stable ligaments.  Full range of motion.  Normal gait    Imaging/Labs/EMG Reviewed and Interpreted:  Imaging Results (Last 24 Hours)       Procedure Component Value Units Date/Time    XR Knee 4+ View Right [[id number]] Resulted: 06/10/24 0808     Updated: 06/10/24 0810    Narrative:      Knee X-Ray  Indication: Pain    Upright AP of bilateral knees. Lateral, skiers and Sunrise views of right   knee     Findings:  No fracture  Right knee bone-on-bone medial compartment with arthritis in both knees "   bone-on-bone medial compartment with osteophytes present  Tibial yifan present in the right tibia  Kellgren-Mario grade 3  No prior studies were available for comparison.                Assessment    Assessment:  1. Right knee pain, unspecified chronicity    2. Primary osteoarthritis of right knee    3. Primary hypertension        Plan:  Recommend over the counter anti-inflammatories for pain and/or swelling  We discussed different treatment options including steroid injections, joint gel injections and knee replacement.  She is not interested in surgery.  I injected her right knee with cortisone.  I discussed with the patient the potential benefits of performing a therapeutic injection of the cortisone as well as potential risks including but not limited to infection, swelling, pain, bleeding, bruising, nerve/vessel damage, skin color changes, transient elevation in blood glucose levels, and fat atrophy. After informed consent and verifying correct patient, procedure site, and type of procedure, the area was prepped with Hibiclens, ethyl chloride was used to numb the skin. The patient tolerated the procedure well. There were no complications.  She will follow-up when injection wears off  She is aware the cortisone may cause a temporary increase in her blood pressure.      Joe Burrell MD  06/10/24  08:15 EDT      Dictated Utilizing Dragon Dictation.

## 2024-06-29 DIAGNOSIS — F32.89 OTHER DEPRESSION: ICD-10-CM

## 2024-07-02 RX ORDER — PANTOPRAZOLE SODIUM 40 MG/1
40 TABLET, DELAYED RELEASE ORAL DAILY
Qty: 90 TABLET | Refills: 0 | Status: SHIPPED | OUTPATIENT
Start: 2024-07-02

## 2024-07-22 RX ORDER — BUPROPION HYDROCHLORIDE 150 MG/1
150 TABLET ORAL DAILY
Qty: 90 TABLET | Refills: 0 | Status: SHIPPED | OUTPATIENT
Start: 2024-07-22

## 2024-08-05 PROCEDURE — 87086 URINE CULTURE/COLONY COUNT: CPT | Performed by: NURSE PRACTITIONER

## 2024-08-07 ENCOUNTER — TELEPHONE (OUTPATIENT)
Dept: INTERNAL MEDICINE | Facility: CLINIC | Age: 59
End: 2024-08-07

## 2024-08-07 NOTE — TELEPHONE ENCOUNTER
"    Caller: Vanna Black \"Celia\"    Relationship: Self    Best call back number: [phone]      What medication are you requesting: DIFFERENT MEDICATION TO HELP WITH A UTI. PATIENT IS ON DAY 6 OF CEPHALEXIN AND PHENAZOPYRID 100 MG AND NOT HELPING.     What are your current symptoms:  FREQUENT URINATION AND STOMACH PAIN     How long have you been experiencing symptoms: 6 DAYS    Have you had these symptoms before:    [x] Yes  [] No    Have you been treated for these symptoms before:   [x] Yes  [] No    If a prescription is needed, what is your preferred pharmacy and phone number:  [facility]    Additional notes: PATIENT IS WANTING TO SEE IF ANOTHER MEDICATION COULD BE CALLED IN FOR HER.           "

## 2024-08-15 ENCOUNTER — HOSPITAL ENCOUNTER (OUTPATIENT)
Dept: MAMMOGRAPHY | Facility: HOSPITAL | Age: 59
Discharge: HOME | End: 2024-08-15
Admitting: NURSE PRACTITIONER
Payer: COMMERCIAL

## 2024-08-15 DIAGNOSIS — Z12.31 SCREENING MAMMOGRAM FOR BREAST CANCER: ICD-10-CM

## 2024-08-15 PROCEDURE — 77063 BREAST TOMOSYNTHESIS BI: CPT

## 2024-08-15 PROCEDURE — 77067 SCR MAMMO BI INCL CAD: CPT

## 2024-08-16 DIAGNOSIS — F32.89 OTHER DEPRESSION: ICD-10-CM

## 2024-08-21 ENCOUNTER — TELEPHONE (OUTPATIENT)
Dept: INTERNAL MEDICINE | Facility: CLINIC | Age: 59
End: 2024-08-21

## 2024-08-21 DIAGNOSIS — F51.01 PRIMARY INSOMNIA: Primary | ICD-10-CM

## 2024-08-21 RX ORDER — ZOLPIDEM TARTRATE 10 MG/1
10 TABLET ORAL NIGHTLY PRN
Qty: 30 TABLET | Refills: 2 | Status: SHIPPED | OUTPATIENT
Start: 2024-08-21

## 2024-08-21 RX ORDER — CELECOXIB 200 MG/1
200 CAPSULE ORAL DAILY
Qty: 90 CAPSULE | Refills: 0 | Status: SHIPPED | OUTPATIENT
Start: 2024-08-21

## 2024-08-30 RX ORDER — TRIAMTERENE/HYDROCHLOROTHIAZID 37.5-25 MG
1 TABLET ORAL DAILY
Qty: 90 TABLET | Refills: 0 | Status: SHIPPED | OUTPATIENT
Start: 2024-08-30

## 2024-10-15 RX ORDER — BUPROPION HYDROCHLORIDE 150 MG/1
150 TABLET ORAL DAILY
Qty: 90 TABLET | Refills: 0 | Status: SHIPPED | OUTPATIENT
Start: 2024-10-15

## 2024-10-15 RX ORDER — PANTOPRAZOLE SODIUM 40 MG/1
40 TABLET, DELAYED RELEASE ORAL DAILY
Qty: 90 TABLET | Refills: 0 | Status: SHIPPED | OUTPATIENT
Start: 2024-10-15

## 2024-11-20 RX ORDER — TRIAMTERENE AND HYDROCHLOROTHIAZIDE 37.5; 25 MG/1; MG/1
1 TABLET ORAL DAILY
Qty: 90 TABLET | Refills: 0 | Status: SHIPPED | OUTPATIENT
Start: 2024-11-20

## 2024-11-20 RX ORDER — CELECOXIB 200 MG/1
200 CAPSULE ORAL DAILY
Qty: 90 CAPSULE | Refills: 0 | Status: SHIPPED | OUTPATIENT
Start: 2024-11-20

## 2024-11-20 NOTE — TELEPHONE ENCOUNTER
"    Caller: Vanna Black \"Celia\"    Relationship: Self    Best call back number: [phone]     Requested Prescriptions:   Requested Prescriptions     Pending Prescriptions Disp Refills    celecoxib (CeleBREX) 200 MG capsule 90 capsule 0     Sig: Take 1 capsule by mouth Daily.    triamterene-hydrochlorothiazide (MAXZIDE-25) 37.5-25 MG per tablet 90 tablet 0     Sig: Take 1 tablet by mouth Daily.        Pharmacy where request should be sent: [id number] [place] [address] [phone] [facility] [phone] FX     Last office visit with prescribing clinician: 3/26/2024   Last telemedicine visit with prescribing clinician: Visit date not found   Next office visit with prescribing clinician: Visit date not found         Does the patient have less than a 3 day supply:  [] Yes  [x] No    Would you like a call back once the refill request has been completed: [] Yes [x] No    If the office needs to give you a call back, can they leave a voicemail: [] Yes [x] No    Manju Dc Rep   11/20/24 14:53 EST         "

## 2024-12-04 DIAGNOSIS — F51.01 PRIMARY INSOMNIA: ICD-10-CM

## 2024-12-04 RX ORDER — ZOLPIDEM TARTRATE 10 MG/1
10 TABLET ORAL NIGHTLY PRN
Qty: 30 TABLET | Refills: 0 | Status: SHIPPED | OUTPATIENT
Start: 2024-12-04

## 2025-01-14 RX ORDER — BUPROPION HYDROCHLORIDE 150 MG/1
150 TABLET ORAL DAILY
Qty: 90 TABLET | Refills: 0 | Status: SHIPPED | OUTPATIENT
Start: 2025-01-14

## 2025-01-14 RX ORDER — PANTOPRAZOLE SODIUM 40 MG/1
40 TABLET, DELAYED RELEASE ORAL DAILY
Qty: 90 TABLET | Refills: 0 | Status: SHIPPED | OUTPATIENT
Start: 2025-01-14

## 2025-01-14 NOTE — TELEPHONE ENCOUNTER
"Caller: Vanna Black \"Celia\"    Relationship: Self    Best call back number: [phone]     Requested Prescriptions:   Requested Prescriptions     Pending Prescriptions Disp Refills    pantoprazole (PROTONIX) 40 MG EC tablet 90 tablet 0     Sig: Take 1 tablet by mouth Daily.    buPROPion XL (WELLBUTRIN XL) 150 MG 24 hr tablet 90 tablet 0     Sig: Take 1 tablet by mouth Daily.        Pharmacy where request should be sent: [facility] [address] [place] [address] [id number] [place] [address] [phone] [facility] [phone] FX     Last office visit with prescribing clinician: 3/26/2024   Last telemedicine visit with prescribing clinician: Visit date not found   Next office visit with prescribing clinician: Visit date not found     Additional details provided by patient: THE PATIENT STATED THAT SHE NEEDS THIS CALLED IN AND THAT WE HAVE CALLED THESE IN FOR HER WHILE IN ANOTHER STATE.    Does the patient have less than a 3 day supply:  [] Yes  [x] No    Would you like a call back once the refill request has been completed: [] Yes [] No    If the office needs to give you a call back, can they leave a voicemail: [] Yes [] No    Manju Paul Rep   01/14/25 14:24 EST         "

## 2025-02-17 RX ORDER — TRIAMTERENE AND HYDROCHLOROTHIAZIDE 37.5; 25 MG/1; MG/1
1 TABLET ORAL DAILY
Qty: 90 TABLET | Refills: 0 | Status: SHIPPED | OUTPATIENT
Start: 2025-02-17

## 2025-02-17 RX ORDER — CELECOXIB 200 MG/1
200 CAPSULE ORAL DAILY
Qty: 90 CAPSULE | Refills: 0 | Status: SHIPPED | OUTPATIENT
Start: 2025-02-17

## 2025-04-04 ENCOUNTER — OFFICE VISIT (OUTPATIENT)
Dept: ORTHOPEDIC SURGERY | Facility: CLINIC | Age: 60
End: 2025-04-04
Payer: COMMERCIAL

## 2025-04-04 VITALS
HEIGHT: 67 IN | BODY MASS INDEX: 23.07 KG/M2 | WEIGHT: 147 LBS | SYSTOLIC BLOOD PRESSURE: 130 MMHG | DIASTOLIC BLOOD PRESSURE: 78 MMHG

## 2025-04-04 DIAGNOSIS — M17.11 PRIMARY OSTEOARTHRITIS OF RIGHT KNEE: Primary | ICD-10-CM

## 2025-04-04 RX ORDER — TRIAMCINOLONE ACETONIDE 40 MG/ML
40 INJECTION, SUSPENSION INTRA-ARTICULAR; INTRAMUSCULAR
Status: COMPLETED | OUTPATIENT
Start: 2025-04-04 | End: 2025-04-04

## 2025-04-04 RX ORDER — FLUCONAZOLE 200 MG/1
TABLET ORAL
COMMUNITY
Start: 2025-02-24

## 2025-04-04 RX ORDER — VALACYCLOVIR HYDROCHLORIDE 1 G/1
1 TABLET, FILM COATED ORAL 3 TIMES DAILY
COMMUNITY
Start: 2025-03-16

## 2025-04-04 RX ORDER — LIDOCAINE HYDROCHLORIDE 10 MG/ML
4 INJECTION, SOLUTION EPIDURAL; INFILTRATION; INTRACAUDAL; PERINEURAL
Status: COMPLETED | OUTPATIENT
Start: 2025-04-04 | End: 2025-04-04

## 2025-04-04 RX ORDER — BUPIVACAINE HYDROCHLORIDE 2.5 MG/ML
4 INJECTION, SOLUTION EPIDURAL; INFILTRATION; INTRACAUDAL; PERINEURAL
Status: COMPLETED | OUTPATIENT
Start: 2025-04-04 | End: 2025-04-04

## 2025-04-04 RX ADMIN — BUPIVACAINE HYDROCHLORIDE 4 ML: 2.5 INJECTION, SOLUTION EPIDURAL; INFILTRATION; INTRACAUDAL; PERINEURAL at 09:01

## 2025-04-04 RX ADMIN — TRIAMCINOLONE ACETONIDE 40 MG: 40 INJECTION, SUSPENSION INTRA-ARTICULAR; INTRAMUSCULAR at 09:01

## 2025-04-04 RX ADMIN — LIDOCAINE HYDROCHLORIDE 4 ML: 10 INJECTION, SOLUTION EPIDURAL; INFILTRATION; INTRACAUDAL; PERINEURAL at 09:01

## 2025-04-04 NOTE — PROGRESS NOTES
"    [facility] [place] Orth[facility] Surgery Clinic Note        Subjective     CC: Follow-up (9.5 MONTH FOLLOW UP---Right knee pain, unspecified chronicity)      HPI    Vanna Black is a 60 y.o. female.  She is here for another right knee injection.  The last 1 was June 10th of last year.    Overall, patient's symptoms are returning.    ROS:    Constiutional:Pt denies fever, chills, nausea, or vomiting.  MSK:as above        Objective      Past Medical History  Past Medical History:   Diagnosis Date    Arthritis     Cancer     Skin    Cervical dysplasia     H/O colonoscopy     Ovarian cancer      Social History     Socioeconomic History    Marital status:    Tobacco Use    Smoking status: Never     Passive exposure: Never    Smokeless tobacco: Never   Vaping Use    Vaping status: Never Used   Substance and Sexual Activity    Alcohol use: Yes    Drug use: No    Sexual activity: Defer          Physical Exam  /78   Ht 170.2 cm (67.01\")   Wt 66.7 kg (147 lb)   LMP  (LMP Unknown)   BMI 23.02 kg/m²     Body mass index is 23.02 kg/m².    Patient is well nourished and well developed.        Ortho Exam  No change.    Imaging/Labs/EMG Reviewed and Interpreted:  Imaging Results (Last 24 Hours)       ** No results found for the last 24 hours. **              Assessment    Assessment:  1. Primary osteoarthritis of right knee        Plan:  Recommend over the counter anti-inflammatories for pain and/or swelling  I injected her right knee with cortisone.  I discussed with the patient the potential benefits of performing a therapeutic injection of the cortisone as well as potential risks including but not limited to infection, swelling, pain, bleeding, bruising, nerve/vessel damage, skin color changes, transient elevation in blood glucose levels, and fat atrophy. After informed consent and verifying correct patient, procedure site, and type of procedure, the area was prepped with Hibiclens, ethyl chloride was used to numb the " skin. The patient tolerated the procedure well. There were no complications.    Joe Burrell MD  04/04/25  09:05 EDT      Dictated Utilizing Dragon Dictation.

## 2025-04-04 NOTE — PROGRESS NOTES
Procedure   - Large Joint Arthrocentesis: R knee on 4/4/2025 9:01 AM  Details: 21 G needle, anterolateral approach  Medications: 4 mL bupivacaine (PF) 0.25 %; 4 mL lidocaine PF 1% 1 %; 40 mg triamcinolone acetonide 40 MG/ML  Outcome: tolerated well, no immediate complications  Procedure, treatment alternatives, risks and benefits explained, specific risks discussed. Consent was given by the patient. Immediately prior to procedure a time out was called to verify the correct patient, procedure, equipment, support staff and site/side marked as required. Patient was prepped and draped in the usual sterile fashion.

## 2025-04-16 PROCEDURE — 87088 URINE BACTERIA CULTURE: CPT | Performed by: FAMILY MEDICINE

## 2025-04-16 PROCEDURE — 87086 URINE CULTURE/COLONY COUNT: CPT | Performed by: FAMILY MEDICINE

## 2025-04-16 PROCEDURE — 87186 SC STD MICRODIL/AGAR DIL: CPT | Performed by: FAMILY MEDICINE

## 2025-04-18 ENCOUNTER — RESULTS FOLLOW-UP (OUTPATIENT)
Dept: URGENT CARE | Facility: CLINIC | Age: 60
End: 2025-04-18
Payer: COMMERCIAL

## 2025-04-18 DIAGNOSIS — T36.95XA ANTIBIOTIC-INDUCED YEAST INFECTION: ICD-10-CM

## 2025-04-18 DIAGNOSIS — B96.20 E-COLI UTI: Primary | ICD-10-CM

## 2025-04-18 DIAGNOSIS — B37.9 ANTIBIOTIC-INDUCED YEAST INFECTION: ICD-10-CM

## 2025-04-18 DIAGNOSIS — N39.0 E-COLI UTI: Primary | ICD-10-CM

## 2025-04-18 RX ORDER — CEFDINIR 300 MG/1
300 CAPSULE ORAL 2 TIMES DAILY
Qty: 14 CAPSULE | Refills: 0 | Status: SHIPPED | OUTPATIENT
Start: 2025-04-18

## 2025-04-18 RX ORDER — FLUCONAZOLE 150 MG/1
150 TABLET ORAL
Qty: 2 TABLET | Refills: 0 | Status: SHIPPED | OUTPATIENT
Start: 2025-04-18

## 2025-04-18 NOTE — TELEPHONE ENCOUNTER
Urine culture results discussed with patient.  She continues to have dysuria with urinary frequency and urgency.  Will discontinue Bactrim and start Rx cefdinir based upon susceptibility testing results.  Medication indications, dosing instructions potential side effects were reviewed with patient.  She request p.o. Diflucan for history of antibiotic associated vaginitis.  Advised follow-up for symptoms that persist or worsen.  Voiced understanding and agreement to the plan

## 2025-04-25 RX ORDER — PANTOPRAZOLE SODIUM 40 MG/1
40 TABLET, DELAYED RELEASE ORAL DAILY
Qty: 90 TABLET | Refills: 0 | Status: SHIPPED | OUTPATIENT
Start: 2025-04-25

## 2025-04-25 RX ORDER — BUPROPION HYDROCHLORIDE 150 MG/1
150 TABLET ORAL DAILY
Qty: 90 TABLET | Refills: 0 | Status: SHIPPED | OUTPATIENT
Start: 2025-04-25

## 2025-05-21 ENCOUNTER — TELEPHONE (OUTPATIENT)
Dept: INTERNAL MEDICINE | Age: 60
End: 2025-05-21
Payer: COMMERCIAL

## 2025-05-21 RX ORDER — CELECOXIB 200 MG/1
200 CAPSULE ORAL DAILY
Qty: 30 CAPSULE | Refills: 0 | Status: SHIPPED | OUTPATIENT
Start: 2025-05-21

## 2025-05-21 NOTE — TELEPHONE ENCOUNTER
"Caller: Vanna Black \"Celia\"    Relationship: Self    Best call back number:      [phone] (Mobile)       What was the call regarding:  PATIENT IS REQUESTING CRYSTAL CALL HER BACK ABOUT HER MEDICATION ONLY BE FILLED FOR 30 DAYS       "

## 2025-06-04 RX ORDER — TRIAMTERENE AND HYDROCHLOROTHIAZIDE 37.5; 25 MG/1; MG/1
1 TABLET ORAL DAILY
Qty: 90 TABLET | Refills: 0 | Status: SHIPPED | OUTPATIENT
Start: 2025-06-04

## 2025-06-13 ENCOUNTER — OFFICE VISIT (OUTPATIENT)
Dept: INTERNAL MEDICINE | Age: 60
End: 2025-06-13
Payer: COMMERCIAL

## 2025-06-13 ENCOUNTER — LAB (OUTPATIENT)
Dept: INTERNAL MEDICINE | Age: 60
End: 2025-06-13
Payer: COMMERCIAL

## 2025-06-13 VITALS
BODY MASS INDEX: 22.13 KG/M2 | HEIGHT: 67 IN | HEART RATE: 68 BPM | DIASTOLIC BLOOD PRESSURE: 74 MMHG | WEIGHT: 141 LBS | SYSTOLIC BLOOD PRESSURE: 120 MMHG

## 2025-06-13 DIAGNOSIS — F32.A DEPRESSION, UNSPECIFIED DEPRESSION TYPE: ICD-10-CM

## 2025-06-13 DIAGNOSIS — Z00.00 ROUTINE GENERAL MEDICAL EXAMINATION AT A HEALTH CARE FACILITY: ICD-10-CM

## 2025-06-13 DIAGNOSIS — R80.9 PROTEINURIA, UNSPECIFIED TYPE: ICD-10-CM

## 2025-06-13 DIAGNOSIS — I10 PRIMARY HYPERTENSION: Primary | ICD-10-CM

## 2025-06-13 DIAGNOSIS — Z00.00 HEALTHCARE MAINTENANCE: Primary | ICD-10-CM

## 2025-06-13 LAB
ALBUMIN SERPL-MCNC: 4.3 G/DL (ref 3.5–5.2)
ALBUMIN UR-MCNC: 2.5 MG/DL
ALBUMIN/GLOB SERPL: 1.9 G/DL
ALP SERPL-CCNC: 49 U/L (ref 39–117)
ALT SERPL W P-5'-P-CCNC: 15 U/L (ref 1–33)
ANION GAP SERPL CALCULATED.3IONS-SCNC: 10.9 MMOL/L (ref 5–15)
AST SERPL-CCNC: 21 U/L (ref 1–32)
BILIRUB BLD-MCNC: NEGATIVE MG/DL
BILIRUB SERPL-MCNC: 0.5 MG/DL (ref 0–1.2)
BUN SERPL-MCNC: 16 MG/DL (ref 8–23)
BUN/CREAT SERPL: 15.7 (ref 7–25)
CALCIUM SPEC-SCNC: 9.6 MG/DL (ref 8.6–10.5)
CHLORIDE SERPL-SCNC: 103 MMOL/L (ref 98–107)
CHOLEST SERPL-MCNC: 183 MG/DL (ref 0–200)
CLARITY, POC: CLEAR
CO2 SERPL-SCNC: 26.1 MMOL/L (ref 22–29)
COLOR UR: YELLOW
CREAT SERPL-MCNC: 1.02 MG/DL (ref 0.57–1)
CREAT UR-MCNC: 196.4 MG/DL
DEPRECATED RDW RBC AUTO: 44.5 FL (ref 37–54)
EGFRCR SERPLBLD CKD-EPI 2021: 62.3 ML/MIN/1.73
ERYTHROCYTE [DISTWIDTH] IN BLOOD BY AUTOMATED COUNT: 11.8 % (ref 12.3–15.4)
EXPIRATION DATE: ABNORMAL
GLOBULIN UR ELPH-MCNC: 2.3 GM/DL
GLUCOSE SERPL-MCNC: 75 MG/DL (ref 65–99)
GLUCOSE UR STRIP-MCNC: NEGATIVE MG/DL
HCT VFR BLD AUTO: 37.8 % (ref 34–46.6)
HDLC SERPL-MCNC: 82 MG/DL (ref 40–60)
HGB BLD-MCNC: 13 G/DL (ref 12–15.9)
KETONES UR QL: NEGATIVE
LDLC SERPL CALC-MCNC: 89 MG/DL (ref 0–100)
LDLC/HDLC SERPL: 1.08 {RATIO}
LEUKOCYTE EST, POC: NEGATIVE
Lab: ABNORMAL
MCH RBC QN AUTO: 35.2 PG (ref 26.6–33)
MCHC RBC AUTO-ENTMCNC: 34.4 G/DL (ref 31.5–35.7)
MCV RBC AUTO: 102.4 FL (ref 79–97)
MICROALBUMIN/CREAT UR: 12.7 MG/G (ref 0–29)
NITRITE UR-MCNC: NEGATIVE MG/ML
PH UR: 6 [PH] (ref 5–8)
PLATELET # BLD AUTO: 243 10*3/MM3 (ref 140–450)
PMV BLD AUTO: 9.7 FL (ref 6–12)
POTASSIUM SERPL-SCNC: 3.9 MMOL/L (ref 3.5–5.2)
PROT SERPL-MCNC: 6.6 G/DL (ref 6–8.5)
PROT UR STRIP-MCNC: ABNORMAL MG/DL
RBC # BLD AUTO: 3.69 10*6/MM3 (ref 3.77–5.28)
RBC # UR STRIP: NEGATIVE /UL
SODIUM SERPL-SCNC: 140 MMOL/L (ref 136–145)
SP GR UR: 1.02 (ref 1–1.03)
TRIGL SERPL-MCNC: 64 MG/DL (ref 0–150)
TSH SERPL DL<=0.05 MIU/L-ACNC: 3.45 UIU/ML (ref 0.27–4.2)
UROBILINOGEN UR QL: NORMAL
VIT B12 BLD-MCNC: >2000 PG/ML (ref 211–946)
VLDLC SERPL-MCNC: 12 MG/DL (ref 5–40)
WBC NRBC COR # BLD AUTO: 4.3 10*3/MM3 (ref 3.4–10.8)

## 2025-06-13 PROCEDURE — 82570 ASSAY OF URINE CREATININE: CPT | Performed by: INTERNAL MEDICINE

## 2025-06-13 PROCEDURE — 80050 GENERAL HEALTH PANEL: CPT | Performed by: INTERNAL MEDICINE

## 2025-06-13 PROCEDURE — 82607 VITAMIN B-12: CPT | Performed by: INTERNAL MEDICINE

## 2025-06-13 PROCEDURE — 82043 UR ALBUMIN QUANTITATIVE: CPT | Performed by: INTERNAL MEDICINE

## 2025-06-13 PROCEDURE — 80061 LIPID PANEL: CPT | Performed by: INTERNAL MEDICINE

## 2025-06-13 PROCEDURE — 36415 COLL VENOUS BLD VENIPUNCTURE: CPT | Performed by: INTERNAL MEDICINE

## 2025-06-13 NOTE — PROGRESS NOTES
Patient is a 60 y.o. female who is here for a follow up of hypertension.  Chief Complaint   Patient presents with    Annual Exam         HPI:    Here for CPE.     History:     Patient Active Problem List   Diagnosis    Anxiety    Depression    Hypertension    Insomnia    Pain in joint    H/O traveler's diarrhea    Boil of scalp    Acute pain of right knee    Fatigue    Herpes zoster without complication       Past Medical History:   Diagnosis Date    Anxiety 15 years sgo    Arthritis     Cancer     Skin    Cervical dysplasia     H/O colonoscopy     Ovarian cancer        Past Surgical History:   Procedure Laterality Date    AUGMENTATION MAMMAPLASTY Bilateral     Redone 3/2022    BREAST EXCISIONAL BIOPSY Left     COSMETIC SURGERY  2023    DIAGNOSTIC LAPAROSCOPY      FOOT SURGERY Left 02/2022    ORIF at     KNEE SURGERY Right     right knee arthroscopy    OOPHORECTOMY      OTHER SURGICAL HISTORY      Facial surgery    OTHER SURGICAL HISTORY      Leg repair    TOTAL ABDOMINAL HYSTERECTOMY      removal of both ovaries       Current Outpatient Medications on File Prior to Visit   Medication Sig    acetaminophen (TYLENOL) 500 MG tablet Take 2 tablets by mouth Every 6 (Six) Hours As Needed.    buPROPion XL (WELLBUTRIN XL) 150 MG 24 hr tablet Take 1 tablet by mouth once daily    celecoxib (CeleBREX) 200 MG capsule Take 1 capsule by mouth once daily    pantoprazole (PROTONIX) 40 MG EC tablet Take 1 tablet by mouth once daily    triamterene-hydrochlorothiazide (MAXZIDE-25) 37.5-25 MG per tablet Take 1 tablet by mouth once daily    valACYclovir (VALTREX) 1000 MG tablet Take 1 tablet by mouth 3 times a day.     No current facility-administered medications on file prior to visit.       Family History   Problem Relation Age of Onset    Mental illness Mother         mental disorder    Breast cancer Mother 54    Hypertension Father     Heart attack Father     Breast cancer Maternal Aunt     Breast cancer Maternal Aunt     Ovarian  "cancer Sister     Anxiety disorder Sister        Social History     Socioeconomic History    Marital status:    Tobacco Use    Smoking status: Never     Passive exposure: Never    Smokeless tobacco: Never   Vaping Use    Vaping status: Never Used   Substance and Sexual Activity    Alcohol use: Yes     Alcohol/week: 10.0 standard drinks of alcohol     Types: 10 Glasses of wine per week    Drug use: No    Sexual activity: Yes     Partners: Male     Birth control/protection: None         Review of Systems   Constitutional:  Negative for chills, diaphoresis, fatigue, fever and unexpected weight change.   HENT:  Negative for congestion, ear pain, hearing loss, rhinorrhea, sinus pressure, sore throat and trouble swallowing.    Eyes:  Negative for discharge and itching.   Respiratory:  Negative for cough, chest tightness and shortness of breath.    Cardiovascular:  Negative for chest pain, palpitations and leg swelling.   Gastrointestinal:  Negative for abdominal pain, blood in stool, constipation, diarrhea, nausea and vomiting.        1/19 colonoscopy without polyps at [facility]   Endocrine: Negative for polydipsia and polyuria.   Genitourinary:  Negative for difficulty urinating, dysuria, enuresis, frequency, hematuria and urgency.        8/24 mammogram   Musculoskeletal:  Negative for arthralgias, back pain, gait problem, joint swelling, myalgias and neck pain.   Skin:  Negative for rash and wound.   Allergic/Immunologic: Negative for immunocompromised state.   Neurological:  Negative for dizziness, syncope, weakness, light-headedness, numbness and headaches.   Hematological:  Does not bruise/bleed easily.   Psychiatric/Behavioral:  Negative for behavioral problems and sleep disturbance.        /74 (BP Location: Right arm, Patient Position: Sitting)   Pulse 68   Ht 170.2 cm (67.01\")   Wt 64 kg (141 lb)   LMP  (LMP Unknown)   BMI 22.08 kg/m²       Physical Exam  Constitutional:       Appearance: Normal " appearance. She is well-developed. She is obese.   HENT:      Head: Normocephalic and atraumatic.      Right Ear: External ear normal.      Left Ear: External ear normal.      Nose: Nose normal.      Mouth/Throat:      Mouth: Mucous membranes are moist.      Pharynx: Oropharynx is clear.   Eyes:      Extraocular Movements: Extraocular movements intact.      Conjunctiva/sclera: Conjunctivae normal.      Pupils: Pupils are equal, round, and reactive to light.   Cardiovascular:      Rate and Rhythm: Normal rate and regular rhythm.      Heart sounds: Normal heart sounds.   Pulmonary:      Effort: Pulmonary effort is normal.      Breath sounds: Normal breath sounds.   Abdominal:      General: Bowel sounds are normal.      Palpations: Abdomen is soft.   Musculoskeletal:         General: Normal range of motion.      Cervical back: Normal range of motion and neck supple.   Lymphadenopathy:      Cervical: No cervical adenopathy.   Skin:     General: Skin is warm and dry.   Neurological:      General: No focal deficit present.      Mental Status: She is alert and oriented to person, place, and time.   Psychiatric:         Mood and Affect: Mood normal.         Behavior: Behavior normal.         Thought Content: Thought content normal.         Procedure:      Historical Data:        HME-counseled on diet and exercise, fasting labs today  Depression/anxiety-cont zoloft and wellbutrin combo  joint pain/DJD-advised to limit narc, tylenol prn  insomnia-cont prn ambien  htn/edema-stable on Maxzide  Situational anxiety-cont 1/2 of hydroxyzine prn  High risk meds-advised to limit NSAIDs      6/13 labs noted and dw patient     Reviewed the following with the patient: advised patient to avoid alcoholic beverages, encouraged patient to exercise 5-7 days per week for 30 minutes at a time, and ideal body weight discussed with patient.      Current Outpatient Medications:     acetaminophen (TYLENOL) 500 MG tablet, Take 2 tablets by mouth  Every 6 (Six) Hours As Needed., Disp: , Rfl:     buPROPion XL (WELLBUTRIN XL) 150 MG 24 hr tablet, Take 1 tablet by mouth once daily, Disp: 90 tablet, Rfl: 0    celecoxib (CeleBREX) 200 MG capsule, Take 1 capsule by mouth once daily, Disp: 30 capsule, Rfl: 0    pantoprazole (PROTONIX) 40 MG EC tablet, Take 1 tablet by mouth once daily, Disp: 90 tablet, Rfl: 0    triamterene-hydrochlorothiazide (MAXZIDE-25) 37.5-25 MG per tablet, Take 1 tablet by mouth once daily, Disp: 90 tablet, Rfl: 0    valACYclovir (VALTREX) 1000 MG tablet, Take 1 tablet by mouth 3 times a day., Disp: , Rfl:         Diagnoses and all orders for this visit:    1. Primary hypertension (Primary)    2. Depression, unspecified depression type    3. Routine general medical examination at a health care facility  -     CBC (No Diff)  -     Comprehensive Metabolic Panel  -     Lipid Panel  -     TSH  -     Vitamin B12  -     POC Urinalysis Dipstick, Automated    4. Proteinuria, unspecified type  -     Microalbumin / Creatinine Urine Ratio - Urine, Clean Catch; Future

## 2025-06-16 RX ORDER — CELECOXIB 200 MG/1
CAPSULE ORAL
Qty: 30 CAPSULE | Refills: 0 | Status: SHIPPED | OUTPATIENT
Start: 2025-06-16 | End: 2025-06-16 | Stop reason: SDUPTHER

## 2025-06-16 RX ORDER — CELECOXIB 200 MG/1
200 CAPSULE ORAL DAILY PRN
Qty: 30 CAPSULE | Refills: 2 | Status: SHIPPED | OUTPATIENT
Start: 2025-06-16

## 2025-06-16 NOTE — TELEPHONE ENCOUNTER
"Caller: Vanna Black \"Celia\"    Relationship: Self    Best call back number: [phone]     Requested Prescriptions:   Requested Prescriptions     Pending Prescriptions Disp Refills    celecoxib (CeleBREX) 200 MG capsule 30 capsule 0        Pharmacy where request should be sent: [facility] [address] [place] [address] [phone] [facility] [phone]      Last office visit with prescribing clinician: 6/13/2025   Last telemedicine visit with prescribing clinician: Visit date not found   Next office visit with prescribing clinician: Visit date not found     Additional details provided by patient: PATIENT NEEDS 90 DAY SUPPLIES WITH REFILLS. PLEASE NOTE: PATIENT WILL NOT ACCEPT 30 DAY PRESCRIPTIONS BECAUSE IT IS TOO EXPENSIVE.      Does the patient have less than a 3 day supply:  [x] Yes  [] No    Would you like a call back once the refill request has been completed: [] Yes [x] No    If the office needs to give you a call back, can they leave a voicemail: [] Yes [x] No    Manju Fountain Rep   06/16/25 12:31 EDT       "

## 2025-06-29 PROCEDURE — 87086 URINE CULTURE/COLONY COUNT: CPT | Performed by: NURSE PRACTITIONER

## 2025-07-01 ENCOUNTER — RESULTS FOLLOW-UP (OUTPATIENT)
Dept: URGENT CARE | Facility: CLINIC | Age: 60
End: 2025-07-01
Payer: COMMERCIAL

## 2025-07-22 RX ORDER — PANTOPRAZOLE SODIUM 40 MG/1
40 TABLET, DELAYED RELEASE ORAL DAILY
Qty: 90 TABLET | Refills: 3 | Status: SHIPPED | OUTPATIENT
Start: 2025-07-22

## 2025-07-22 RX ORDER — BUPROPION HYDROCHLORIDE 150 MG/1
150 TABLET ORAL DAILY
Qty: 90 TABLET | Refills: 3 | Status: SHIPPED | OUTPATIENT
Start: 2025-07-22

## 2025-08-01 ENCOUNTER — OFFICE VISIT (OUTPATIENT)
Dept: ORTHOPEDIC SURGERY | Facility: CLINIC | Age: 60
End: 2025-08-01
Payer: COMMERCIAL

## 2025-08-01 VITALS
HEIGHT: 67 IN | BODY MASS INDEX: 22.47 KG/M2 | WEIGHT: 143.2 LBS | DIASTOLIC BLOOD PRESSURE: 70 MMHG | SYSTOLIC BLOOD PRESSURE: 102 MMHG

## 2025-08-01 DIAGNOSIS — M17.11 PRIMARY OSTEOARTHRITIS OF RIGHT KNEE: Primary | ICD-10-CM

## 2025-08-01 RX ORDER — LIDOCAINE HYDROCHLORIDE 10 MG/ML
4 INJECTION, SOLUTION EPIDURAL; INFILTRATION; INTRACAUDAL; PERINEURAL
Status: COMPLETED | OUTPATIENT
Start: 2025-08-01 | End: 2025-08-01

## 2025-08-01 RX ORDER — DEXAMETHASONE SODIUM PHOSPHATE 4 MG/ML
4 INJECTION, SOLUTION INTRA-ARTICULAR; INTRALESIONAL; INTRAMUSCULAR; INTRAVENOUS; SOFT TISSUE
Status: COMPLETED | OUTPATIENT
Start: 2025-08-01 | End: 2025-08-01

## 2025-08-01 RX ORDER — BUPIVACAINE HYDROCHLORIDE 2.5 MG/ML
4 INJECTION, SOLUTION EPIDURAL; INFILTRATION; INTRACAUDAL; PERINEURAL
Status: COMPLETED | OUTPATIENT
Start: 2025-08-01 | End: 2025-08-01

## 2025-08-01 RX ADMIN — BUPIVACAINE HYDROCHLORIDE 4 ML: 2.5 INJECTION, SOLUTION EPIDURAL; INFILTRATION; INTRACAUDAL; PERINEURAL at 11:03

## 2025-08-01 RX ADMIN — DEXAMETHASONE SODIUM PHOSPHATE 4 MG: 4 INJECTION, SOLUTION INTRA-ARTICULAR; INTRALESIONAL; INTRAMUSCULAR; INTRAVENOUS; SOFT TISSUE at 11:03

## 2025-08-01 RX ADMIN — LIDOCAINE HYDROCHLORIDE 4 ML: 10 INJECTION, SOLUTION EPIDURAL; INFILTRATION; INTRACAUDAL; PERINEURAL at 11:03

## 2025-08-01 NOTE — PROGRESS NOTES
Procedure   - Large Joint Arthrocentesis: R knee on 8/1/2025 11:03 AM  Indications: pain  Details: 21 G needle, anterolateral approach  Medications: 4 mL bupivacaine (PF) 0.25 %; 4 mL lidocaine PF 1% 1 %; 4 mg dexAMETHasone 4 MG/ML  Outcome: tolerated well, no immediate complications  Procedure, treatment alternatives, risks and benefits explained, specific risks discussed. Consent was given by the patient. Immediately prior to procedure a time out was called to verify the correct patient, procedure, equipment, support staff and site/side marked as required. Patient was prepped and draped in the usual sterile fashion.

## 2025-08-01 NOTE — PROGRESS NOTES
"    [facility] [place] Orthopedic Surgery Clinic Note        Subjective     CC: Follow-up (4 month follow up -- Primary osteoarthritis of right knee)      HPI    Vanna Black is a 60 y.o. female.  She has returning right knee pain after playing golf and walking a lot.  As well as it hurts in the medial side of her knee.  Her last injection was April 4 of this year.    Overall, patient's symptoms are returning.    ROS:    Constiutional:Pt denies fever, chills, nausea, or vomiting.  MSK:as above        Objective      Past Medical History  Past Medical History:   Diagnosis Date    Anxiety 15 years sgo    Arthritis     Cancer     Skin    Cervical dysplasia     H/O colonoscopy     Knee swelling 2019    Ovarian cancer     Tear of meniscus of knee 2021    Nov-Dec     Social History     Socioeconomic History    Marital status:    Tobacco Use    Smoking status: Never     Passive exposure: Never    Smokeless tobacco: Never   Vaping Use    Vaping status: Never Used   Substance and Sexual Activity    Alcohol use: Yes     Alcohol/week: 14.0 standard drinks of alcohol     Types: 10 Glasses of wine, 4 Drinks containing 0.5 oz of alcohol per week    Drug use: No    Sexual activity: Yes     Partners: Male     Birth control/protection: None          Physical Exam  /70   Ht 170.2 cm (67.01\")   Wt 65 kg (143 lb 3.2 oz)   LMP  (LMP Unknown)   BMI 22.42 kg/m²     Body mass index is 22.42 kg/m².    Patient is well nourished and well developed.        Ortho Exam  No change.  Trace effusion with medial joint tenderness.    Imaging/Labs/EMG Reviewed and Interpreted:  Imaging Results (Last 24 Hours)       ** No results found for the last 24 hours. **              Assessment    Assessment:  1. Primary osteoarthritis of right knee        Plan:  Recommend over the counter anti-inflammatories for pain and/or swelling  I injected her right knee with cortisone.  I discussed with the patient the potential benefits of performing a therapeutic " injection of the cortisone as well as potential risks including but not limited to infection, swelling, pain, bleeding, bruising, nerve/vessel damage, skin color changes, transient elevation in blood glucose levels, and fat atrophy. After informed consent and verifying correct patient, procedure site, and type of procedure, the area was prepped with Hibiclens, ethyl chloride was used to numb the skin. The patient tolerated the procedure well. There were no complications.    Joe Burrell MD  08/01/25  11:03 EDT      Dictated Utilizing Dragon Dictation.   5

## 2025-08-13 ENCOUNTER — TELEPHONE (OUTPATIENT)
Dept: ORTHOPEDIC SURGERY | Facility: CLINIC | Age: 60
End: 2025-08-13
Payer: COMMERCIAL

## 2025-08-15 ENCOUNTER — OFFICE VISIT (OUTPATIENT)
Dept: ORTHOPEDIC SURGERY | Facility: CLINIC | Age: 60
End: 2025-08-15
Payer: COMMERCIAL

## 2025-08-15 VITALS
SYSTOLIC BLOOD PRESSURE: 110 MMHG | DIASTOLIC BLOOD PRESSURE: 62 MMHG | WEIGHT: 143.3 LBS | BODY MASS INDEX: 22.49 KG/M2 | HEIGHT: 67 IN

## 2025-08-15 DIAGNOSIS — M17.11 PRIMARY OSTEOARTHRITIS OF RIGHT KNEE: Primary | ICD-10-CM

## 2025-08-25 ENCOUNTER — CLINICAL SUPPORT (OUTPATIENT)
Dept: ORTHOPEDIC SURGERY | Facility: CLINIC | Age: 60
End: 2025-08-25
Payer: COMMERCIAL

## 2025-08-25 DIAGNOSIS — M17.11 PRIMARY OSTEOARTHRITIS OF RIGHT KNEE: Primary | ICD-10-CM

## 2025-08-25 PROCEDURE — 20610 DRAIN/INJ JOINT/BURSA W/O US: CPT | Performed by: ORTHOPAEDIC SURGERY

## 2025-08-25 RX ORDER — TRIAMTERENE AND HYDROCHLOROTHIAZIDE 37.5; 25 MG/1; MG/1
1 TABLET ORAL DAILY
Qty: 90 TABLET | Refills: 0 | Status: SHIPPED | OUTPATIENT
Start: 2025-08-25

## 2025-08-26 ENCOUNTER — TELEPHONE (OUTPATIENT)
Dept: INTERNAL MEDICINE | Age: 60
End: 2025-08-26
Payer: COMMERCIAL